# Patient Record
Sex: FEMALE | Race: WHITE | NOT HISPANIC OR LATINO | Employment: UNEMPLOYED | URBAN - METROPOLITAN AREA
[De-identification: names, ages, dates, MRNs, and addresses within clinical notes are randomized per-mention and may not be internally consistent; named-entity substitution may affect disease eponyms.]

---

## 2017-01-26 ENCOUNTER — TRANSFERRED RECORDS (OUTPATIENT)
Dept: HEALTH INFORMATION MANAGEMENT | Facility: CLINIC | Age: 37
End: 2017-01-26

## 2017-05-26 ENCOUNTER — TELEPHONE (OUTPATIENT)
Dept: FAMILY MEDICINE | Facility: CLINIC | Age: 37
End: 2017-05-26

## 2017-05-26 NOTE — TELEPHONE ENCOUNTER
Panel Management Review      Patient has the following on her problem list: None      Composite cancer screening  Chart review shows that this patient is due/due soon for the following Pap Smear  Summary:    Patient is due/failing the following:   PAP and PHYSICAL    Action needed:   Patient needs office visit for Pap and PX.    Type of outreach:    LMTC regarding Pap and if she goes to OBGYN?    Questions for provider review:    None                                                                                                                                    Shanique NYE M.A.       Chart routed to Care Team .

## 2017-07-19 ENCOUNTER — OFFICE VISIT (OUTPATIENT)
Dept: FAMILY MEDICINE | Facility: CLINIC | Age: 37
End: 2017-07-19
Payer: COMMERCIAL

## 2017-07-19 VITALS
SYSTOLIC BLOOD PRESSURE: 100 MMHG | HEIGHT: 66 IN | TEMPERATURE: 97.9 F | HEART RATE: 84 BPM | OXYGEN SATURATION: 100 % | DIASTOLIC BLOOD PRESSURE: 62 MMHG

## 2017-07-19 DIAGNOSIS — Z00.00 ENCOUNTER FOR ROUTINE ADULT HEALTH EXAMINATION WITHOUT ABNORMAL FINDINGS: Primary | ICD-10-CM

## 2017-07-19 PROCEDURE — G0145 SCR C/V CYTO,THINLAYER,RESCR: HCPCS | Performed by: NURSE PRACTITIONER

## 2017-07-19 PROCEDURE — 99395 PREV VISIT EST AGE 18-39: CPT | Performed by: NURSE PRACTITIONER

## 2017-07-19 PROCEDURE — 87624 HPV HI-RISK TYP POOLED RSLT: CPT | Performed by: NURSE PRACTITIONER

## 2017-07-19 RX ORDER — MULTIVITAMIN WITH IRON
1 TABLET ORAL DAILY
Qty: 30 TABLET | COMMUNITY
Start: 2017-07-19

## 2017-07-19 NOTE — MR AVS SNAPSHOT
After Visit Summary   7/19/2017    Rafa Herring    MRN: [id number]           Patient Information     Date Of Birth          1980        Visit Information        Provider Department      7/19/2017 10:00 AM Alethea Edward Ra, APRN [facility]        Care Instructions      Preventive Health Recommendations  Female Ages 26 - 39  Yearly exam:   See your health care provider every year in order to    Review health changes.     Discuss preventive care.      Review your medicines if you your doctor has prescribed any.    Until age 30: Get a Pap test every three years (more often if you have had an abnormal result).    After age 30: Talk to your doctor about whether you should have a Pap test every 3 years or have a Pap test with HPV screening every 5 years.   You do not need a Pap test if your uterus was removed (hysterectomy) and you have not had cancer.  You should be tested each year for STDs (sexually transmitted diseases), if you're at risk.   Talk to your provider about how often to have your cholesterol checked.  If you are at risk for diabetes, you should have a diabetes test (fasting glucose).  Shots: Get a flu shot each year. Get a tetanus shot every 10 years.   Nutrition:     Eat at least 5 servings of fruits and vegetables each day.    Eat whole-grain bread, whole-wheat pasta and brown rice instead of white grains and rice.    Talk to your provider about Calcium and Vitamin D.     Lifestyle    Exercise at least 150 minutes a week (30 minutes a day, 5 days of the week). This will help you control your weight and prevent disease.    Limit alcohol to one drink per day.    No smoking.     Wear sunscreen to prevent skin cancer.    See your dentist every six months for an exam and cleaning.            Follow-ups after your visit        Who to contact     If you have questions or need follow up information about today's clinic visit or your schedule please contact [place]  [facility] directly at [phone].  Normal or non-critical lab and imaging results will be communicated to you by MyChart, letter or phone within 4 business days after the clinic has received the results. If you do not hear from us within 7 days, please contact the clinic through Doppelgangerhart or phone. If you have a critical or abnormal lab result, we will notify you by phone as soon as possible.  Submit refill requests through Trubates or call your pharmacy and they will forward the refill request to us. Please allow 3 business days for your refill to be completed.          Additional Information About Your Visit        DoppelgangerharTheFormTool Information     Trubates gives you secure access to your electronic health record. If you see a primary care provider, you can also send messages to your care team and make appointments. If you have questions, please call your primary care clinic.  If you do not have a primary care provider, please call [phone] and they will assist you.        Care EveryWhere ID     This is your Care EveryWhere ID. This could be used by other organizations to access your [facility] medical records  [id number]        Your Vitals Were     Pulse Temperature Height Last Period Pulse Oximetry       84 97.9  F (36.6  C) (Oral) 5' 5.5\" (1.664 m) 06/29/2017 100%        Blood Pressure from Last 3 Encounters:   07/19/17 100/62   11/03/16 108/70    Weight from Last 3 Encounters:   11/03/16 145 lb (65.8 kg)              Today, you had the following     No orders found for display       Primary Care Provider    None Specified       No primary provider on file.        Equal Access to Services     [facility]: Hadii francis wooteno Sohayde, waaxda luqadaha, qaybta kaalmada loreneyada, lamberto dinh . So [facility] [phone].    ATENCIÓN: Si habla español, tiene a garg disposición servicios gratuitos de asistencia lingüística. Llame al [phone].    We comply with applicable federal civil " rights laws and [place] laws. We do not discriminate on the basis of race, color, national origin, age, disability sex, sexual orientation or gender identity.            Thank you!     Thank you for choosing [facility]  for your care. Our goal is always to provide you with excellent care. Hearing back from our patients is one way we can continue to improve our services. Please take a few minutes to complete the written survey that you may receive in the mail after your visit with us. Thank you!             Your Updated Medication List - Protect others around you: Learn how to safely use, store and throw away your medicines at www.disposemymeds.org.          This list is accurate as of: 7/19/17 10:27 AM.  Always use your most recent med list.                   Brand Name Dispense Instructions for use Diagnosis    magnesium 250 MG tablet     30 tablet    Take 1 tablet by mouth daily

## 2017-07-19 NOTE — NURSING NOTE
"Chief Complaint   Patient presents with     Physical       Initial /62  Pulse 84  Temp 97.9  F (36.6  C) (Oral)  Ht 5' 5.5\" (1.664 m)  LMP 06/29/2017  SpO2 100% Estimated body mass index is 23.76 kg/(m^2) as calculated from the following:    Height as of 11/3/16: 5' 5.5\" (1.664 m).    Weight as of 11/3/16: 145 lb (65.8 kg).  Medication Reconciliation: complete   ENRIQUE DukeA.      "

## 2017-07-19 NOTE — PROGRESS NOTES
SUBJECTIVE:   CC: Rafa Herring is an 36 year old woman who presents for preventive health visit.     Physical   Annual:     Getting at least 3 servings of Calcium per day::  Yes    Bi-annual eye exam::  NO    Dental care twice a year::  Yes    Sleep apnea or symptoms of sleep apnea::  None    Diet::  Regular (no restrictions)    Frequency of exercise::  2-3 days/week    Duration of exercise::  15-30 minutes    Taking medications regularly::  Not Applicable    Additional concerns today::  No          Today's PHQ-2 Score: PHQ-2 ( 1999 Pfizer) 7/18/2017   Q1: Little interest or pleasure in doing things 0   Q2: Feeling down, depressed or hopeless 0   PHQ-2 Score 0   Q1: Little interest or pleasure in doing things Not at all   Q2: Feeling down, depressed or hopeless Not at all   PHQ-2 Score 0       Abuse: Current or Past(Physical, Sexual or Emotional)- No  Do you feel safe in your environment - Yes    Social History   Substance Use Topics     Smoking status: Never Smoker     Smokeless tobacco: Never Used     Alcohol use No     The patient does not drink >3 drinks per day nor >7 drinks per week.    Reviewed orders with patient.  Reviewed health maintenance and updated orders accordingly - Yes  There is no problem list on file for this patient.    Past Surgical History:   Procedure Laterality Date     PHOTOREFRACTIVE KERATECTOMY  2004       Social History   Substance Use Topics     Smoking status: Never Smoker     Smokeless tobacco: Never Used     Alcohol use No     Family History   Problem Relation Age of Onset     Glaucoma Maternal Grandmother      Family History Negative Mother      Family History Negative Father      Macular Degeneration No family hx of              Mammogram not appropriate for this patient based on age.    Pertinent mammograms are reviewed under the imaging tab.  History of abnormal Pap smear: NO - age 30-65 PAP every 5 years with negative HPV co-testing recommended    Reviewed and updated as  "needed this visit by clinical staffTobacco  Allergies  Meds  Med Hx  Surg Hx  Fam Hx  Soc Hx        Reviewed and updated as needed this visit by Provider          ROS:  C: NEGATIVE for fever, chills, change in weight  I: NEGATIVE for worrisome rashes, moles or lesions  E: NEGATIVE for vision changes or irritation  ENT: NEGATIVE for ear, mouth and throat problems  R: NEGATIVE for significant cough or SOB  B: NEGATIVE for masses, tenderness or discharge  CV: NEGATIVE for chest pain, palpitations or peripheral edema  GI: NEGATIVE for nausea, abdominal pain, heartburn, or change in bowel habits  : NEGATIVE for unusual urinary or vaginal symptoms. Periods are regular.  M: NEGATIVE for significant arthralgias or myalgia  N: NEGATIVE for weakness, dizziness or paresthesias  P: NEGATIVE for changes in mood or affect     OBJECTIVE:    5' 5.5\" (1.664 m)  EXAM:  GENERAL: healthy, alert and no distress  EYES: Eyes grossly normal to inspection, PERRL and conjunctivae and sclerae normal  HENT: ear canals and TM's normal, nose and mouth without ulcers or lesions  NECK: no adenopathy, no asymmetry, masses, or scars and thyroid normal to palpation  RESP: lungs clear to auscultation - no rales, rhonchi or wheezes  BREAST: normal without masses, tenderness or nipple discharge and no palpable axillary masses or adenopathy  CV: regular rate and rhythm, normal S1 S2, no S3 or S4, no murmur, click or rub, no peripheral edema and peripheral pulses strong  ABDOMEN: soft, nontender, no hepatosplenomegaly, no masses and bowel sounds normal   (female): normal female external genitalia, normal urethral meatus , vaginal mucosa pink, moist, well rugated and normal cervix  MS: no gross musculoskeletal defects noted, no edema  SKIN: no suspicious lesions or rashes  NEURO: Normal strength and tone, mentation intact and speech normal  PSYCH: mentation appears normal, affect normal/bright    ASSESSMENT/PLAN:   1. Encounter for routine " "adult health examination without abnormal findings  36 y.o. Female, here today for wwe.  No concerns.  Future labs when fasting.  Pap today.  - **Comprehensive metabolic panel FUTURE 1yr; Future  - Lipid panel reflex to direct LDL; Future  - Pap imaged thin layer screen with HPV - recommended age 30 - 65 years (select HPV order below)    COUNSELING:  Reviewed preventive health counseling, as reflected in patient instructions       reports that she has never smoked. She has never used smokeless tobacco.    Estimated body mass index is 23.76 kg/(m^2) as calculated from the following:    Height as of 11/3/16: 5' 5.5\" (1.664 m).    Weight as of 11/3/16: 145 lb (65.8 kg).         Counseling Resources:  ATP IV Guidelines  Pooled Cohorts Equation Calculator  Breast Cancer Risk Calculator  FRAX Risk Assessment  ICSI Preventive Guidelines  Dietary Guidelines for Americans, 2010  USDA's MyPlate  ASA Prophylaxis  Lung CA Screening    FINN Campos Ra [facility]  "

## 2017-07-21 LAB
COPATH REPORT: NORMAL
PAP: NORMAL

## 2017-07-25 LAB
FINAL DIAGNOSIS: NORMAL
HPV HR 12 DNA CVX QL NAA+PROBE: NEGATIVE
HPV16 DNA SPEC QL NAA+PROBE: NEGATIVE
HPV18 DNA SPEC QL NAA+PROBE: NEGATIVE
SPECIMEN DESCRIPTION: NORMAL

## 2017-09-05 ENCOUNTER — TRANSFERRED RECORDS (OUTPATIENT)
Dept: HEALTH INFORMATION MANAGEMENT | Facility: CLINIC | Age: 37
End: 2017-09-05

## 2017-09-08 ENCOUNTER — ALLIED HEALTH/NURSE VISIT (OUTPATIENT)
Dept: NURSING | Facility: CLINIC | Age: 37
End: 2017-09-08
Payer: COMMERCIAL

## 2017-09-08 DIAGNOSIS — Z23 NEED FOR PROPHYLACTIC VACCINATION AND INOCULATION AGAINST INFLUENZA: Primary | ICD-10-CM

## 2017-09-08 PROCEDURE — 90471 IMMUNIZATION ADMIN: CPT

## 2017-09-08 PROCEDURE — 90686 IIV4 VACC NO PRSV 0.5 ML IM: CPT

## 2017-09-08 PROCEDURE — 99207 ZZC NO CHARGE NURSE ONLY: CPT

## 2017-09-08 NOTE — MR AVS SNAPSHOT
After Visit Summary   9/8/2017    Rafa Herring    MRN: [id number]           Patient Information     Date Of Birth          1980        Visit Information        Provider Department      9/8/2017 11:30 AM  NURSE [facility]        Today's Diagnoses     Need for prophylactic vaccination and inoculation against influenza    -  1       Follow-ups after your visit        Who to contact     If you have questions or need follow up information about today's clinic visit or your schedule please contact [facility] directly at [phone].  Normal or non-critical lab and imaging results will be communicated to you by Gecko TVhart, letter or phone within 4 business days after the clinic has received the results. If you do not hear from us within 7 days, please contact the clinic through sceniost or phone. If you have a critical or abnormal lab result, we will notify you by phone as soon as possible.  Submit refill requests through Cinnamon or call your pharmacy and they will forward the refill request to us. Please allow 3 business days for your refill to be completed.          Additional Information About Your Visit        MyChart Information     Cinnamon gives you secure access to your electronic health record. If you see a primary care provider, you can also send messages to your care team and make appointments. If you have questions, please call your primary care clinic.  If you do not have a primary care provider, please call [phone] and they will assist you.        Care EveryWhere ID     This is your Care EveryWhere ID. This could be used by other organizations to access your [facility] medical records  [id number]         Blood Pressure from Last 3 Encounters:   07/19/17 100/62   11/03/16 108/70    Weight from Last 3 Encounters:   11/03/16 145 lb (65.8 kg)              We Performed the Following     FLU VAC, SPLIT VIRUS IM > 3 YO (QUADRIVALENT) [04138]     Vaccine  Administration, Initial [16454]        Primary Care Provider    None Specified       No primary provider on file.        Equal Access to Services     [facility] : Hadii francis Calle, feliciano collins, robinsky griffithspadminilinette waldrop, lamberto brookeedenzandra lewis. So [facility] [phone].    ATENCIÓN: Si habla español, tiene a garg disposición servicios gratuitos de asistencia lingüística. Llame al [phone].    We comply with applicable federal civil rights laws and [place] laws. We do not discriminate on the basis of race, color, national origin, age, disability sex, sexual orientation or gender identity.            Thank you!     Thank you for choosing [facility]  for your care. Our goal is always to provide you with excellent care. Hearing back from our patients is one way we can continue to improve our services. Please take a few minutes to complete the written survey that you may receive in the mail after your visit with us. Thank you!             Your Updated Medication List - Protect others around you: Learn how to safely use, store and throw away your medicines at www.disposemymeds.org.          This list is accurate as of: 9/8/17 11:41 AM.  Always use your most recent med list.                   Brand Name Dispense Instructions for use Diagnosis    magnesium 250 MG tablet     30 tablet    Take 1 tablet by mouth daily

## 2017-09-08 NOTE — PROGRESS NOTES
Injectable Influenza Immunization Documentation    1.  Are you sick today? (Fever of 100.5 or higher on the day of the clinic)   No    2.  Have you ever had Guillain-Pioneer Syndrome within 6 weeks of an influenza vaccionation?  No    3. Do you have a life-threatening allergy to eggs?  No    4. Do you have a life-threatening allergy to a component of the vaccine? May include antibiotics, gelatin or latex.  No     5. Have you ever had a reaction to a dose of flu vaccine that needed immediate medical attention?  No     Form completed by Shanique NYE M.A.

## 2017-09-21 ENCOUNTER — TRANSFERRED RECORDS (OUTPATIENT)
Dept: HEALTH INFORMATION MANAGEMENT | Facility: CLINIC | Age: 37
End: 2017-09-21

## 2017-11-17 ENCOUNTER — OFFICE VISIT (OUTPATIENT)
Dept: FAMILY MEDICINE | Facility: CLINIC | Age: 37
End: 2017-11-17
Payer: OTHER GOVERNMENT

## 2017-11-17 VITALS
WEIGHT: 147.9 LBS | HEART RATE: 91 BPM | BODY MASS INDEX: 23.77 KG/M2 | RESPIRATION RATE: 16 BRPM | OXYGEN SATURATION: 100 % | TEMPERATURE: 98.2 F | SYSTOLIC BLOOD PRESSURE: 102 MMHG | HEIGHT: 66 IN | DIASTOLIC BLOOD PRESSURE: 70 MMHG

## 2017-11-17 DIAGNOSIS — G43.709 CHRONIC MIGRAINE WITHOUT AURA WITHOUT STATUS MIGRAINOSUS, NOT INTRACTABLE: Primary | ICD-10-CM

## 2017-11-17 LAB
ERYTHROCYTE [DISTWIDTH] IN BLOOD BY AUTOMATED COUNT: 12.1 % (ref 10–15)
ERYTHROCYTE [SEDIMENTATION RATE] IN BLOOD BY WESTERGREN METHOD: 9 MM/H (ref 0–20)
HCT VFR BLD AUTO: 39.2 % (ref 35–47)
HGB BLD-MCNC: 12.7 G/DL (ref 11.7–15.7)
MCH RBC QN AUTO: 29.7 PG (ref 26.5–33)
MCHC RBC AUTO-ENTMCNC: 32.4 G/DL (ref 31.5–36.5)
MCV RBC AUTO: 92 FL (ref 78–100)
PLATELET # BLD AUTO: 229 10E9/L (ref 150–450)
RBC # BLD AUTO: 4.27 10E12/L (ref 3.8–5.2)
WBC # BLD AUTO: 6.5 10E9/L (ref 4–11)

## 2017-11-17 PROCEDURE — 80053 COMPREHEN METABOLIC PANEL: CPT | Performed by: NURSE PRACTITIONER

## 2017-11-17 PROCEDURE — 84443 ASSAY THYROID STIM HORMONE: CPT | Performed by: NURSE PRACTITIONER

## 2017-11-17 PROCEDURE — 99214 OFFICE O/P EST MOD 30 MIN: CPT | Performed by: NURSE PRACTITIONER

## 2017-11-17 PROCEDURE — 85027 COMPLETE CBC AUTOMATED: CPT | Performed by: NURSE PRACTITIONER

## 2017-11-17 PROCEDURE — 36415 COLL VENOUS BLD VENIPUNCTURE: CPT | Performed by: NURSE PRACTITIONER

## 2017-11-17 PROCEDURE — 85652 RBC SED RATE AUTOMATED: CPT | Performed by: NURSE PRACTITIONER

## 2017-11-17 RX ORDER — SUMATRIPTAN 25 MG/1
25-50 TABLET, FILM COATED ORAL
Qty: 9 TABLET | Refills: 1 | Status: SHIPPED | OUTPATIENT
Start: 2017-11-17 | End: 2017-12-12

## 2017-11-17 NOTE — NURSING NOTE
"Chief Complaint   Patient presents with     Headache       Initial /70 (BP Location: Right arm, Patient Position: Chair, Cuff Size: Adult Large)  Pulse 91  Temp 98.2  F (36.8  C) (Oral)  Resp 16  Ht 5' 5.5\" (1.664 m)  Wt 147 lb 14.4 oz (67.1 kg)  SpO2 100%  BMI 24.24 kg/m2 Estimated body mass index is 24.24 kg/(m^2) as calculated from the following:    Height as of this encounter: 5' 5.5\" (1.664 m).    Weight as of this encounter: 147 lb 14.4 oz (67.1 kg).  Medication Reconciliation: complete   Agnes Hou MA       "

## 2017-11-17 NOTE — PATIENT INSTRUCTIONS
Monitor your symptoms closely.  Start to journal about your headaches to look for a trigger.  Use the imitrex as needed.  Update me with how you are feeling and I will let you know about your lab results.

## 2017-11-17 NOTE — PROGRESS NOTES
SUBJECTIVE:   Rafa Herring is a 37 year old female who presents to clinic today for the following health issues:    Headaches/Migrianes       Duration: Ongoing over the last few years     Description  Location: bilateral in the temporal area   Character: throbbing pain  Frequency:  10 days out of every month   Duration:  Depends, but usually several days.     Intensity:  moderate, severe    Accompanying signs and symptoms:    Precipitating or Alleviating factors:  Nausea/vomiting: usually nausea   Dizziness: no  Weakness or numbness: no  Visual changes: none  Fever: no   Sinus or URI symptoms no     History  Head trauma: YES- fell on head during childhood.   Family history of migraines: YES- mother   Previous tests for headaches: no   Neurologist evaluations: no   Able to do daily activities when headache present: YES- but it makes it very difficult.   Wake with headaches: YES  Daily pain medication use: not daily, but when migraine is present will try OTC pain relief.   Any changes in: None    Precipitating or Alleviating factors (light/sound/sleep/caffeine): None     Therapies tried and outcome: Ibuprofen (Advil, Motrin), Naproxyn (Aleve), Tylenol and Excedrin    Outcome - Excedrin migraine used to work, but no longer seems to be effective.   Frequent/daily pain medication use: no     Migraines started with her first pregnancy 8 years ago.  Intermittent for the following years, controlled with prn excedrin migraine.  Over the past year she has had about 7-10 days per month where she experiences symptoms.  Typically start on one side, lasts for 3 days, then resolves but moves to the other side for another 3 days.  Excedrin sometimes helpful, other times not.  She also uses aleve.  Thinks it could be associated with menses but unsure of any specific triggers.  No fevers, n/v.  Normal intake.  No weight loss.  Normal hearing and vision.  No aura with migraines but does have nausea.  Periods are regular in  "frequency.  Sleep is disrupted with young children.    Problem list and histories reviewed & adjusted, as indicated.  Additional history: as documented    There is no problem list on file for this patient.    Past Surgical History:   Procedure Laterality Date     PHOTOREFRACTIVE KERATECTOMY  2004       Social History   Substance Use Topics     Smoking status: Never Smoker     Smokeless tobacco: Never Used     Alcohol use No     Family History   Problem Relation Age of Onset     Glaucoma Maternal Grandmother      Family History Negative Mother      Family History Negative Father      Macular Degeneration No family hx of              Reviewed and updated as needed this visit by clinical staff       Reviewed and updated as needed this visit by Provider         ROS:  SEE HPI.    OBJECTIVE:     /70 (BP Location: Right arm, Patient Position: Chair, Cuff Size: Adult Large)  Pulse 91  Temp 98.2  F (36.8  C) (Oral)  Resp 16  Ht 5' 5.5\" (1.664 m)  Wt 147 lb 14.4 oz (67.1 kg)  SpO2 100%  BMI 24.24 kg/m2  Body mass index is 24.24 kg/(m^2).  GENERAL: healthy, alert and no distress  EYES: Eyes grossly normal to inspection, PERRL and conjunctivae and sclerae normal  HENT: ear canals and TM's normal, nose and mouth without ulcers or lesions  NECK: no adenopathy, no asymmetry, masses, or scars and thyroid normal to palpation  RESP: lungs clear to auscultation - no rales, rhonchi or wheezes  CV: regular rate and rhythm, normal S1 S2, no S3 or S4, no murmur, click or rub, no peripheral edema and peripheral pulses strong  ABDOMEN: soft, nontender, no hepatosplenomegaly, no masses and bowel sounds normal  NEURO: Normal strength and tone, sensory exam grossly normal, mentation intact, cranial nerves 2-12 intact and rapid alternating movements normal  PSYCH: mentation appears normal, affect normal/bright    Diagnostic Test Results:  Results for orders placed or performed in visit on 11/17/17 (from the past 24 hour(s))   CBC " with platelets   Result Value Ref Range    WBC 6.5 4.0 - 11.0 10e9/L    RBC Count 4.27 3.8 - 5.2 10e12/L    Hemoglobin 12.7 11.7 - 15.7 g/dL    Hematocrit 39.2 35.0 - 47.0 %    MCV 92 78 - 100 fl    MCH 29.7 26.5 - 33.0 pg    MCHC 32.4 31.5 - 36.5 g/dL    RDW 12.1 10.0 - 15.0 %    Platelet Count 229 150 - 450 10e9/L   ESR: Erythrocyte sedimentation rate   Result Value Ref Range    Sed Rate 9 0 - 20 mm/h       ASSESSMENT/PLAN:   1. Chronic migraine without aura without status migrainosus, not intractable  37 y.o. Female, long hx of migraines, more prominent over the past 1 year.  Discussed options.  Labs today.  Start a journal to look for trigger.  Trial prn imitrex.  If unable to identify trigger or make modifications, will consider propranolol and she will return for this.  Pt agrees with plan and verbalized understanding.  - Comprehensive metabolic panel (BMP + Alb, Alk Phos, ALT, AST, Total. Bili, TP)  - CBC with platelets  - ESR: Erythrocyte sedimentation rate  - TSH with free T4 reflex  - SUMAtriptan (IMITREX) 25 MG tablet; Take 1-2 tablets (25-50 mg) by mouth at onset of headache for migraine May repeat in 2 hours. Max 8 tablets/24 hours.  Dispense: 9 tablet; Refill: 1    FINN Campos Ra, CNP  [facility]

## 2017-11-17 NOTE — MR AVS SNAPSHOT
After Visit Summary   11/17/2017    Rafa Herring    MRN: [id number]           Patient Information     Date Of Birth          1980        Visit Information        Provider Department      11/17/2017 8:40 AM Alethea Edward Ra, APRN CNP [facility]        Today's Diagnoses     Chronic migraine without aura without status migrainosus, not intractable    -  1      Care Instructions    Monitor your symptoms closely.  Start to journal about your headaches to look for a trigger.  Use the imitrex as needed.  Update me with how you are feeling and I will let you know about your lab results.          Follow-ups after your visit        Who to contact     If you have questions or need follow up information about today's clinic visit or your schedule please contact [facility] directly at [phone].  Normal or non-critical lab and imaging results will be communicated to you by MyChart, letter or phone within 4 business days after the clinic has received the results. If you do not hear from us within 7 days, please contact the clinic through MyChart or phone. If you have a critical or abnormal lab result, we will notify you by phone as soon as possible.  Submit refill requests through [employer] or call your pharmacy and they will forward the refill request to us. Please allow 3 business days for your refill to be completed.          Additional Information About Your Visit        MyChart Information     [employer] gives you secure access to your electronic health record. If you see a primary care provider, you can also send messages to your care team and make appointments. If you have questions, please call your primary care clinic.  If you do not have a primary care provider, please call [phone] and they will assist you.        Care EveryWhere ID     This is your Care EveryWhere ID. This could be used by other organizations to access your [facility]  "records  [id number]        Your Vitals Were     Pulse Temperature Respirations Height Pulse Oximetry BMI (Body Mass Index)    91 98.2  F (36.8  C) (Oral) 16 5' 5.5\" (1.664 m) 100% 24.24 kg/m2       Blood Pressure from Last 3 Encounters:   11/17/17 102/70   07/19/17 100/62   11/03/16 108/70    Weight from Last 3 Encounters:   11/17/17 147 lb 14.4 oz (67.1 kg)   11/03/16 145 lb (65.8 kg)              We Performed the Following     CBC with platelets     Comprehensive metabolic panel (BMP + Alb, Alk Phos, ALT, AST, Total. Bili, TP)     ESR: Erythrocyte sedimentation rate     TSH with free T4 reflex          Today's Medication Changes          These changes are accurate as of: 11/17/17  9:20 AM.  If you have any questions, ask your nurse or doctor.               Start taking these medicines.        Dose/Directions    SUMAtriptan 25 MG tablet   Commonly known as:  IMITREX   Used for:  Chronic migraine without aura without status migrainosus, not intractable   Started by:  Alethea Edward Ra, APRN CNP        Dose:  25-50 mg   Take 1-2 tablets (25-50 mg) by mouth at onset of headache for migraine May repeat in 2 hours. Max 8 tablets/24 hours.   Quantity:  9 tablet   Refills:  1            Where to get your medicines      These medications were sent to [facility] [place], [place] - [address]  [address] [place] [address] [facility] [address]     Phone:  [phone]     SUMAtriptan 25 MG tablet                Primary Care Provider Office Phone # Fax #    FINN Campos Ra [facility] [phone]       [address]  [facility] [address]        Equal Access to Services     WENDY DENG AH: Lawson Calle, waaxda luqadaha, qaybta kaalmada adeegyada, lamberto lewis. So [facility] [place] [phone].    ATENCIÓN: Si habla español, tiene a garg disposición servicios gratuitos de asistencia lingüística. Llame al [phone].    We comply with applicable federal civil " rights laws and [place] laws. We do not discriminate on the basis of race, color, national origin, age, disability, sex, sexual orientation, or gender identity.            Thank you!     Thank you for choosing [facility]  for your care. Our goal is always to provide you with excellent care. Hearing back from our patients is one way we can continue to improve our services. Please take a few minutes to complete the written survey that you may receive in the mail after your visit with us. Thank you!             Your Updated Medication List - Protect others around you: Learn how to safely use, store and throw away your medicines at www.disposemymeds.org.          This list is accurate as of: 11/17/17  9:20 AM.  Always use your most recent med list.                   Brand Name Dispense Instructions for use Diagnosis    magnesium 250 MG tablet     30 tablet    Take 1 tablet by mouth daily        SUMAtriptan 25 MG tablet    IMITREX    9 tablet    Take 1-2 tablets (25-50 mg) by mouth at onset of headache for migraine May repeat in 2 hours. Max 8 tablets/24 hours.    Chronic migraine without aura without status migrainosus, not intractable       UNABLE TO FIND      MEDICATION NAME: MindClear, multivitamin, magnesium/herbal vitamin.

## 2017-11-19 LAB
ALBUMIN SERPL-MCNC: 4 G/DL (ref 3.4–5)
ALP SERPL-CCNC: 71 U/L (ref 40–150)
ALT SERPL W P-5'-P-CCNC: 23 U/L (ref 0–50)
ANION GAP SERPL CALCULATED.3IONS-SCNC: 8 MMOL/L (ref 3–14)
AST SERPL W P-5'-P-CCNC: 12 U/L (ref 0–45)
BILIRUB SERPL-MCNC: 0.5 MG/DL (ref 0.2–1.3)
BUN SERPL-MCNC: 17 MG/DL (ref 7–30)
CALCIUM SERPL-MCNC: 8.8 MG/DL (ref 8.5–10.1)
CHLORIDE SERPL-SCNC: 108 MMOL/L (ref 94–109)
CO2 SERPL-SCNC: 23 MMOL/L (ref 20–32)
CREAT SERPL-MCNC: 0.88 MG/DL (ref 0.52–1.04)
GFR SERPL CREATININE-BSD FRML MDRD: 72 ML/MIN/1.7M2
GLUCOSE SERPL-MCNC: 85 MG/DL (ref 70–99)
POTASSIUM SERPL-SCNC: 4.2 MMOL/L (ref 3.4–5.3)
PROT SERPL-MCNC: 7.8 G/DL (ref 6.8–8.8)
SODIUM SERPL-SCNC: 139 MMOL/L (ref 133–144)
TSH SERPL DL<=0.005 MIU/L-ACNC: 0.67 MU/L (ref 0.4–4)

## 2017-12-11 ENCOUNTER — MYC REFILL (OUTPATIENT)
Dept: FAMILY MEDICINE | Facility: CLINIC | Age: 37
End: 2017-12-11

## 2017-12-11 DIAGNOSIS — G43.709 CHRONIC MIGRAINE WITHOUT AURA WITHOUT STATUS MIGRAINOSUS, NOT INTRACTABLE: ICD-10-CM

## 2017-12-11 RX ORDER — SUMATRIPTAN 25 MG/1
25-50 TABLET, FILM COATED ORAL
Qty: 9 TABLET | Refills: 1 | Status: CANCELLED | OUTPATIENT
Start: 2017-12-11

## 2017-12-12 ENCOUNTER — MYC REFILL (OUTPATIENT)
Dept: FAMILY MEDICINE | Facility: CLINIC | Age: 37
End: 2017-12-12

## 2017-12-12 DIAGNOSIS — G43.709 CHRONIC MIGRAINE WITHOUT AURA WITHOUT STATUS MIGRAINOSUS, NOT INTRACTABLE: ICD-10-CM

## 2017-12-12 NOTE — TELEPHONE ENCOUNTER
Message from [employer]:  Original authorizing provider: FINN Campos Ra, CNP    Rafa Herring would like a refill of the following medications:  SUMAtriptan (IMITREX) 25 MG tablet [FINN Campos Ra, CNP]    Preferred pharmacy: [facility] [address]    Comment:  The SUMAtriptan seems to be helping reduce and relieve my migraines most of the time. I am still keeping a daily journal to keep closer track of what days I am having a migraine. So far I have not seen an obvious links to food triggers.

## 2017-12-12 NOTE — TELEPHONE ENCOUNTER
Message from [employer]:  Original authorizing provider: FINN Campos Ra, CNP    Rafa Herring would like a refill of the following medications:  SUMAtriptan (IMITREX) 25 MG tablet [FINN Campos Ra, CNP]    Preferred pharmacy: [facility] [address]    Comment:  I already used the refill. There are no refills remaining.

## 2017-12-13 RX ORDER — SUMATRIPTAN 25 MG/1
25-50 TABLET, FILM COATED ORAL
Qty: 9 TABLET | Refills: 1 | Status: SHIPPED | OUTPATIENT
Start: 2017-12-13 | End: 2019-01-25

## 2017-12-13 NOTE — TELEPHONE ENCOUNTER
Need to make sure not taking more than two times per week.  Will have rebound effect if taking more.  Consider propranolol as we discussed in clinic.  Return to clinic if she is open to this.  WARD

## 2017-12-18 ENCOUNTER — OFFICE VISIT (OUTPATIENT)
Dept: FAMILY MEDICINE | Facility: CLINIC | Age: 37
End: 2017-12-18
Payer: OTHER GOVERNMENT

## 2017-12-18 VITALS
OXYGEN SATURATION: 100 % | HEART RATE: 78 BPM | BODY MASS INDEX: 23.8 KG/M2 | DIASTOLIC BLOOD PRESSURE: 70 MMHG | WEIGHT: 145.2 LBS | TEMPERATURE: 97.7 F | SYSTOLIC BLOOD PRESSURE: 102 MMHG

## 2017-12-18 DIAGNOSIS — G43.709 CHRONIC MIGRAINE WITHOUT AURA WITHOUT STATUS MIGRAINOSUS, NOT INTRACTABLE: Primary | ICD-10-CM

## 2017-12-18 LAB
ALBUMIN SERPL-MCNC: 4 G/DL (ref 3.4–5)
ALP SERPL-CCNC: 77 U/L (ref 40–150)
ALT SERPL W P-5'-P-CCNC: 23 U/L (ref 0–50)
ANION GAP SERPL CALCULATED.3IONS-SCNC: 2 MMOL/L (ref 3–14)
AST SERPL W P-5'-P-CCNC: 11 U/L (ref 0–45)
BILIRUB SERPL-MCNC: 0.5 MG/DL (ref 0.2–1.3)
BUN SERPL-MCNC: 17 MG/DL (ref 7–30)
CALCIUM SERPL-MCNC: 9.1 MG/DL (ref 8.5–10.1)
CHLORIDE SERPL-SCNC: 107 MMOL/L (ref 94–109)
CHOLEST SERPL-MCNC: 162 MG/DL
CO2 SERPL-SCNC: 28 MMOL/L (ref 20–32)
CREAT SERPL-MCNC: 0.82 MG/DL (ref 0.52–1.04)
GFR SERPL CREATININE-BSD FRML MDRD: 78 ML/MIN/1.7M2
GLUCOSE SERPL-MCNC: 98 MG/DL (ref 70–99)
HDLC SERPL-MCNC: 68 MG/DL
LDLC SERPL CALC-MCNC: 75 MG/DL
NONHDLC SERPL-MCNC: 94 MG/DL
POTASSIUM SERPL-SCNC: 4.5 MMOL/L (ref 3.4–5.3)
PROT SERPL-MCNC: 8.1 G/DL (ref 6.8–8.8)
SODIUM SERPL-SCNC: 137 MMOL/L (ref 133–144)
TRIGL SERPL-MCNC: 96 MG/DL

## 2017-12-18 PROCEDURE — 36415 COLL VENOUS BLD VENIPUNCTURE: CPT | Performed by: NURSE PRACTITIONER

## 2017-12-18 PROCEDURE — 80053 COMPREHEN METABOLIC PANEL: CPT | Performed by: NURSE PRACTITIONER

## 2017-12-18 PROCEDURE — 99213 OFFICE O/P EST LOW 20 MIN: CPT | Performed by: NURSE PRACTITIONER

## 2017-12-18 PROCEDURE — 80061 LIPID PANEL: CPT | Performed by: NURSE PRACTITIONER

## 2017-12-18 RX ORDER — PROPRANOLOL HYDROCHLORIDE 10 MG/1
10 TABLET ORAL 2 TIMES DAILY
Qty: 60 TABLET | Refills: 1 | Status: SHIPPED | OUTPATIENT
Start: 2017-12-18 | End: 2018-02-05

## 2017-12-18 NOTE — NURSING NOTE
"Chief Complaint   Patient presents with     Headache       Initial /70  Pulse 78  Temp 97.7  F (36.5  C) (Oral)  Wt 145 lb 3.2 oz (65.9 kg)  SpO2 100%  BMI 23.8 kg/m2 Estimated body mass index is 23.8 kg/(m^2) as calculated from the following:    Height as of 11/17/17: 5' 5.5\" (1.664 m).    Weight as of this encounter: 145 lb 3.2 oz (65.9 kg).  Medication Reconciliation: complete   Shanique NYE M.A.      "

## 2017-12-18 NOTE — PROGRESS NOTES
SUBJECTIVE:   Rafa Herring is a 37 year old female who presents to clinic today for the following health issues:      Headaches      Duration: 4 days ago    Description  Location: unilateral in the right frontal area but switches sides  Character: throbbing pain, dull pain  Frequency:  15 in the month  Duration:  Several days    Intensity:  moderate    Accompanying signs and symptoms:    Precipitating or Alleviating factors:  Nausea/vomiting: usually  Dizziness: no  Weakness or numbness: no  Visual changes: none  Fever: no   Sinus or URI symptoms no     History  Head trauma: no  Family history of migraines: YES- Mom- Menopause  Previous tests for headaches: no   Neurologist evaluations: no   Able to do daily activities when headache present: YES- with difficulty  Wake with headaches: YES  Daily pain medication use: YES  Any changes in: None    Precipitating or Alleviating factors (light/sound/sleep/caffeine): None    Therapies tried and outcome: Imitrex    Outcome - not effective  Frequent/daily pain medication use: YES      Pt presents for follow up.  She has been keeping a journal regarding her headaches.  Found that she has had migraines 15 out of the past 30 days.  No clear trigger found.  She is using imitrex, but finding she is using too often.  It does help if not alleviate her symptoms, but often the migraine will return later that day or the following day.  She is also concerned as her dad had a stroke years ago and was told there was a clotting disorder that his kids may also have.    Problem list and histories reviewed & adjusted, as indicated.  Additional history: as documented    There is no problem list on file for this patient.    Past Surgical History:   Procedure Laterality Date     PHOTOREFRACTIVE KERATECTOMY  2004       Social History   Substance Use Topics     Smoking status: Never Smoker     Smokeless tobacco: Never Used     Alcohol use No     Family History   Problem Relation Age of Onset      Glaucoma Maternal Grandmother      Family History Negative Mother      Family History Negative Father      Macular Degeneration No family hx of              Reviewed and updated as needed this visit by clinical staff       Reviewed and updated as needed this visit by Provider         ROS:  SEE HPI.    OBJECTIVE:     /70  Pulse 78  Temp 97.7  F (36.5  C) (Oral)  Wt 145 lb 3.2 oz (65.9 kg)  SpO2 100%  BMI 23.8 kg/m2  Body mass index is 23.8 kg/(m^2).  GENERAL: healthy, alert and no distress  PSYCH: mentation appears normal, affect normal/bright    Diagnostic Test Results:  none     ASSESSMENT/PLAN:   1. Chronic migraine without aura without status migrainosus, not intractable  37 y.o. Female, hx of migraines, no trigger found.  Discussed options.  Start propranolol now.  Will plan on increasing dose over time.  Will return for bp check.  Neurology referral placed.  Pt agrees with plan and verbalized understanding.  - propranolol (INDERAL) 10 MG tablet; Take 1 tablet (10 mg) by mouth 2 times daily  Dispense: 60 tablet; Refill: 1  - NEUROLOGY ADULT REFERRAL    FINN Campos Ra, CNP  [facility]

## 2017-12-18 NOTE — MR AVS SNAPSHOT
After Visit Summary   12/18/2017    Rafa Herring    MRN: [id number]           Patient Information     Date Of Birth          1980        Visit Information        Provider Department      12/18/2017 7:40 AM Alethea Edward Ra, APRN CNP [facility]        Today's Diagnoses     Chronic migraine without aura without status migrainosus, not intractable    -  1    Encounter for routine adult health examination without abnormal findings          Care Instructions    Start the propranolol.  We can increase the dose of this pretty quickly as long as you are able to stop in for a blood pressure check at a [place] location.    Also, schedule with [facility] in [place].          Follow-ups after your visit        Additional Services     NEUROLOGY ADULT REFERRAL       Your provider has referred you for the following:   Consult at [facility] [phone]   [email or web address]    Please be aware that coverage of these services is subject to the terms and limitations of your health insurance plan.  Call member services at your health plan with any benefit or coverage questions.      Please bring the following with you to your appointment:    (1) Any X-Rays, CTs or MRIs which have been performed.  Contact the facility where they were done to arrange for  prior to your scheduled appointment.    (2) List of current medications  (3) This referral request   (4) Any documents/labs given to you for this referral                  Who to contact     If you have questions or need follow up information about today's clinic visit or your schedule please contact [facility] directly at [phone].  Normal or non-critical lab and imaging results will be communicated to you by MyChart, letter or phone within 4 business days after the clinic has received the results. If you do not hear from us within 7 days, please contact the clinic  through [employer] or phone. If you have a critical or abnormal lab result, we will notify you by phone as soon as possible.  Submit refill requests through [employer] or call your pharmacy and they will forward the refill request to us. Please allow 3 business days for your refill to be completed.          Additional Information About Your Visit        [employer] Information     [employer] gives you secure access to your electronic health record. If you see a primary care provider, you can also send messages to your care team and make appointments. If you have questions, please call your primary care clinic.  If you do not have a primary care provider, please call [phone] and they will assist you.        Care EveryWhere ID     This is your Care EveryWhere ID. This could be used by other organizations to access your [place] medical records  [id number]        Your Vitals Were     Pulse Temperature Pulse Oximetry BMI (Body Mass Index)          78 97.7  F (36.5  C) (Oral) 100% 23.8 kg/m2         Blood Pressure from Last 3 Encounters:   12/18/17 102/70   11/17/17 102/70   07/19/17 100/62    Weight from Last 3 Encounters:   12/18/17 145 lb 3.2 oz (65.9 kg)   11/17/17 147 lb 14.4 oz (67.1 kg)   11/03/16 145 lb (65.8 kg)              We Performed the Following     **Comprehensive metabolic panel FUTURE 1yr     Lipid panel reflex to direct LDL     NEUROLOGY ADULT REFERRAL          Today's Medication Changes          These changes are accurate as of: 12/18/17  8:17 AM.  If you have any questions, ask your nurse or doctor.               Start taking these medicines.        Dose/Directions    propranolol 10 MG tablet   Commonly known as:  INDERAL   Used for:  Chronic migraine without aura without status migrainosus, not intractable   Started by:  Alethea Edward Ra, APRN CNP        Dose:  10 mg   Take 1 tablet (10 mg) by mouth 2 times daily   Quantity:  60 tablet   Refills:  1            Where to get your medicines      These  medications were sent to [facility], [place] - [address]  [address], [facility] [address]     Phone:  [phone]     propranolol 10 MG tablet                Primary Care Provider Office Phone # Fax #    FINN Campos Ra, [phone] [address]  Counts include [address] at the [facility] [address]        Equal Access to Services     [facility] : Hadii aad ku hadasho Soomaali, waaxda luqadaha, qaybta kaalmada adeegyada, waxay idiin hayaan adeeg kharash la'aan . So [facility] [phone].    ATENCIÓN: Si habla espjoi, tiene a garg disposición servicios gratuitos de asistencia lingüística. Huame al [phone].    We comply with applicable federal civil rights laws and [place] laws. We do not discriminate on the basis of race, color, national origin, age, disability, sex, sexual orientation, or gender identity.            Thank you!     Thank you for choosing [facility]  for your care. Our goal is always to provide you with excellent care. Hearing back from our patients is one way we can continue to improve our services. Please take a few minutes to complete the written survey that you may receive in the mail after your visit with us. Thank you!             Your Updated Medication List - Protect others around you: Learn how to safely use, store and throw away your medicines at www.disposemymeds.org.          This list is accurate as of: 12/18/17  8:17 AM.  Always use your most recent med list.                   Brand Name Dispense Instructions for use Diagnosis    magnesium 250 MG tablet     30 tablet    Take 1 tablet by mouth daily        propranolol 10 MG tablet    INDERAL    60 tablet    Take 1 tablet (10 mg) by mouth 2 times daily    Chronic migraine without aura without status migrainosus, not intractable       SUMAtriptan 25 MG tablet    IMITREX    9 tablet    Take 1-2 tablets (25-50 mg) by mouth at onset of headache for migraine May repeat in 2 hours.  Max 8 tablets/24 hours.    Chronic migraine without aura without status migrainosus, not intractable

## 2017-12-18 NOTE — PATIENT INSTRUCTIONS
Start the propranolol.  We can increase the dose of this pretty quickly as long as you are able to stop in for a blood pressure check at a [place] location.    Also, schedule with [facility] in [place].

## 2017-12-26 ENCOUNTER — ALLIED HEALTH/NURSE VISIT (OUTPATIENT)
Dept: FAMILY MEDICINE | Facility: CLINIC | Age: 37
End: 2017-12-26
Payer: OTHER GOVERNMENT

## 2017-12-26 VITALS — DIASTOLIC BLOOD PRESSURE: 68 MMHG | HEART RATE: 60 BPM | SYSTOLIC BLOOD PRESSURE: 102 MMHG

## 2017-12-26 DIAGNOSIS — Z01.30 BLOOD PRESSURE CHECK: Primary | ICD-10-CM

## 2017-12-26 PROCEDURE — 99207 ZZC NO CHARGE NURSE ONLY: CPT | Performed by: NURSE PRACTITIONER

## 2017-12-26 NOTE — MR AVS SNAPSHOT
After Visit Summary   12/26/2017    Rafa Herring    MRN: [id number]           Patient Information     Date Of Birth          1980        Visit Information        Provider Department      12/26/2017 1:14 PM Alethea Edward Ra, APRN CNP [facility]        Today's Diagnoses     Blood pressure check    -  1       Follow-ups after your visit        Who to contact     If you have questions or need follow up information about today's clinic visit or your schedule please contact [facility] directly at [phone].  Normal or non-critical lab and imaging results will be communicated to you by Andelahart, letter or phone within 4 business days after the clinic has received the results. If you do not hear from us within 7 days, please contact the clinic through [employer] or phone. If you have a critical or abnormal lab result, we will notify you by phone as soon as possible.  Submit refill requests through [employer] or call your pharmacy and they will forward the refill request to us. Please allow 3 business days for your refill to be completed.          Additional Information About Your Visit        MyChart Information     [employer] gives you secure access to your electronic health record. If you see a primary care provider, you can also send messages to your care team and make appointments. If you have questions, please call your primary care clinic.  If you do not have a primary care provider, please call [phone] and they will assist you.        Care EveryWhere ID     This is your Care EveryWhere ID. This could be used by other organizations to access your [place] medical records  [id number]        Your Vitals Were     Pulse                   60            Blood Pressure from Last 3 Encounters:   12/26/17 102/68   12/18/17 102/70   11/17/17 102/70    Weight from Last 3 Encounters:   12/18/17 145 lb 3.2 oz (65.9 kg)   11/17/17 147 lb 14.4 oz (67.1 kg)   11/03/16 145 lb  (65.8 kg)              Today, you had the following     No orders found for display       Primary Care Provider Office Phone # Fax #    FINN Campos Ra [facility] [phone] [address]  [facility] [address]        Equal Access to Services     WENDY DENG : Hadii aad ku hadasho Soomaali, waaxda luqadaha, qaybta kaalmada adeegyada, waxay idiin hayaan adeeg kharash la'aba . So [facility] [phone].    ATENCIÓN: Si habla español, tiene a garg disposición servicios gratuitos de asistencia lingüística. Llame al [phone].    We comply with applicable federal civil rights laws and [place] laws. We do not discriminate on the basis of race, color, national origin, age, disability, sex, sexual orientation, or gender identity.            Thank you!     Thank you for choosing [facility]  for your care. Our goal is always to provide you with excellent care. Hearing back from our patients is one way we can continue to improve our services. Please take a few minutes to complete the written survey that you may receive in the mail after your visit with us. Thank you!             Your Updated Medication List - Protect others around you: Learn how to safely use, store and throw away your medicines at www.disposemymeds.org.          This list is accurate as of: 12/26/17  1:15 PM.  Always use your most recent med list.                   Brand Name Dispense Instructions for use Diagnosis    magnesium 250 MG tablet     30 tablet    Take 1 tablet by mouth daily        propranolol 10 MG tablet    INDERAL    60 tablet    Take 1 tablet (10 mg) by mouth 2 times daily    Chronic migraine without aura without status migrainosus, not intractable       SUMAtriptan 25 MG tablet    IMITREX    9 tablet    Take 1-2 tablets (25-50 mg) by mouth at onset of headache for migraine May repeat in 2 hours. Max 8 tablets/24 hours.    Chronic migraine without aura without status migrainosus, not intractable

## 2017-12-26 NOTE — PROGRESS NOTES
Rafa Herring is enrolled/participating in the retail pharmacy Blood Pressure Goals Achievement Program (BPGAP).  Rafa Herring was evaluated at [facility] on December 26, 2017 at which time her blood pressure was:    BP Readings from Last 3 Encounters:   12/26/17 102/68   12/18/17 102/70   11/17/17 102/70     Reviewed lifestyle modifications for blood pressure control and reduction: including making healthy food choices, managing weight, getting regular exercise, smoking cessation, reducing alcohol consumption, monitoring blood pressure regularly.     Rafa Herring is not experiencing symptoms.    Follow-Up: BP is at goal of < 140/90mmHg (patient 18+ years of age with or without diabetes).  Recommended follow-up at pharmacy in 6 months.     Recommendation to Provider: Continue current medication     Rafa Herring was evaluated for enrollment into the PGEN study today.    Patient eligible for enrollment:  No  Patient interested in enrollment:  No    Completed by: Ashleigh Parikh, PharmD  [facility]

## 2017-12-31 ENCOUNTER — HOSPITAL ENCOUNTER (EMERGENCY)
Facility: CLINIC | Age: 37
Discharge: HOME | End: 2017-12-31
Attending: NURSE PRACTITIONER | Admitting: NURSE PRACTITIONER
Payer: OTHER GOVERNMENT

## 2017-12-31 VITALS
TEMPERATURE: 98.2 F | SYSTOLIC BLOOD PRESSURE: 138 MMHG | BODY MASS INDEX: 22.5 KG/M2 | WEIGHT: 140 LBS | HEIGHT: 66 IN | OXYGEN SATURATION: 100 % | RESPIRATION RATE: 18 BRPM | DIASTOLIC BLOOD PRESSURE: 90 MMHG

## 2017-12-31 DIAGNOSIS — T15.92XA EYE FOREIGN BODY, LEFT, INITIAL ENCOUNTER: ICD-10-CM

## 2017-12-31 DIAGNOSIS — S05.02XA ABRASION OF LEFT CORNEA, INITIAL ENCOUNTER: ICD-10-CM

## 2017-12-31 PROCEDURE — 99283 EMERGENCY DEPT VISIT LOW MDM: CPT

## 2017-12-31 RX ORDER — HYDROCODONE BITARTRATE AND ACETAMINOPHEN 5; 325 MG/1; MG/1
1-2 TABLET ORAL EVERY 4 HOURS PRN
Qty: 15 TABLET | Refills: 0 | Status: SHIPPED | OUTPATIENT
Start: 2017-12-31 | End: 2018-06-24

## 2017-12-31 RX ORDER — TETRACAINE HYDROCHLORIDE 5 MG/ML
SOLUTION OPHTHALMIC
Status: DISCONTINUED
Start: 2017-12-31 | End: 2017-12-31 | Stop reason: HOSPADM

## 2017-12-31 RX ORDER — POLYMYXIN B SULFATE AND TRIMETHOPRIM 1; 10000 MG/ML; [USP'U]/ML
1 SOLUTION OPHTHALMIC
Qty: 1 BOTTLE | Refills: 0 | Status: SHIPPED | OUTPATIENT
Start: 2017-12-31 | End: 2018-01-07

## 2017-12-31 ASSESSMENT — VISUAL ACUITY
OS: 20/20
OD: 20/20

## 2017-12-31 ASSESSMENT — ENCOUNTER SYMPTOMS: EYE PAIN: 1

## 2017-12-31 NOTE — ED AVS SNAPSHOT
[facility] Department    [address]    [facility] [address]    Phone:  [phone]    Fax:  [phone]                                       Rafa Herring   MRN: [id number]    Department:  [facility] Emergency Department   Date of Visit:  12/31/2017           Patient Information     Date Of Birth          1980        Your diagnoses for this visit were:     Eye foreign body, left, initial encounter     Abrasion of left cornea, initial encounter        You were seen by Elmer Muller APRN CNP.      Follow-up Information     Please follow up.    Why:  follow up with opthamology in 24 hours        Discharge Instructions         Corneal Abrasion    You have received a scratch or scrape (abrasion) to your cornea. The cornea is the clear part in the front of the eye. This sensitive area is very painful when injured. You may make tears frequently, and your vision may be blurry until the injury heals. You may be sensitive to light.  This part of the body heals quickly. You can expect the pain to go away within 24 to 48 hours. If the abrasion is large or deep, your doctor may apply an eye patch, although this is not always done. An antibiotic ointment or eye drops may also be used to prevent infection.  Numbing drops may be used to relieve the pain temporarily so that your eyes can be examined. But these drops can t be prescribed for home use because that would prevent healing and lead to more serious problems. Also, if you can t feel your eye, there is a chance of accidentally injuring it further without knowing it.  Home care    A cold pack may be applied over the eye (or eye patch) for 20 minutes at a time, to reduce pain. To make a cold pack, put ice cubes in a plastic bag that seals at the top. Wrap the bag in a clean, thin towel or cloth.    You may use acetaminophen or ibuprofen to control pain, unless another pain medicine was prescribed. Note: If you have  chronic liver or kidney disease or have ever had a stomach ulcer or GI bleeding, talk with your doctor before using these medicines.    Rest your eyes and don t read until symptoms are gone.    If you use contact lenses, don t wear them until all symptoms are gone.    If your vision is affected by the corneal abrasion or if an eye patch was applied, don t drive a motor vehicle or operate machinery until all symptoms are gone. You may have trouble judging distances using only one eye.    If your eyes are sensitive to light, try wearing sunglasses, or stay indoors until symptoms go away.  Follow-up care  Follow up with your healthcare provider, or as advised.    If no patch was put on your eye and the pain continues for more than 48 hours, you should have another exam. Contact your healthcare provider to arrange this.    If your eye was patched and you were asked to remove the patch yourself, see your healthcare provider. Contact your healthcare provider if you still have pain after the patch is removed.    If you were given a return appointment for patch removal and re-examination, be sure to keep the appointment. Leaving the patch in place longer than advised could be harmful.  When to seek medical advice  Call your healthcare provider right away if any of these occur.    Increasing eye pain or pain that does not improve after 24 hours    Discharge from the eye    Increasing redness of the eye or swelling of the eyelids    Worsening vision    Symptoms get worse after the abrasion has healed  Date Last Reviewed: 7/1/2017 2000-2017 [employer]. [address] [place] [address]. All rights reserved. This information is not intended as a substitute for professional medical care. Always follow your healthcare professional's instructions.          24 Hour Appointment Hotline       To make an appointment at any [facility], call [phone] ([phone]). If you don't have a family  doctor or clinic, we will help you find one. [facility] are conveniently located to serve the needs of you and your family.             Review of your medicines      START taking        Dose / Directions Last dose taken    HYDROcodone-acetaminophen 5-325 MG per tablet   Commonly known as:  NORCO   Dose:  1-2 tablet   Quantity:  15 tablet        Take 1-2 tablets by mouth every 4 hours as needed for moderate to severe pain   Refills:  0        trimethoprim-polymyxin b ophthalmic solution   Commonly known as:  POLYTRIM   Dose:  1 drop   Quantity:  1 Bottle        Place 1 drop Into the left eye every 3 hours for 7 days   Refills:  0          Our records show that you are taking the medicines listed below. If these are incorrect, please call your family doctor or clinic.        Dose / Directions Last dose taken    magnesium 250 MG tablet   Dose:  1 tablet   Quantity:  30 tablet        Take 1 tablet by mouth daily   Refills:  0        propranolol 10 MG tablet   Commonly known as:  INDERAL   Dose:  10 mg   Quantity:  60 tablet        Take 1 tablet (10 mg) by mouth 2 times daily   Refills:  1        SUMAtriptan 25 MG tablet   Commonly known as:  IMITREX   Dose:  25-50 mg   Quantity:  9 tablet        Take 1-2 tablets (25-50 mg) by mouth at onset of headache for migraine May repeat in 2 hours. Max 8 tablets/24 hours.   Refills:  1                Prescriptions were sent or printed at these locations (2 Prescriptions)                   Other Prescriptions                Printed at Department/Unit printer (2 of 2)         HYDROcodone-acetaminophen (NORCO) 5-325 MG per tablet               trimethoprim-polymyxin b (POLYTRIM) ophthalmic solution                Orders Needing Specimen Collection     None      Pending Results     No orders found from 12/29/2017 to 1/1/2018.            Pending Culture Results     No orders found from 12/29/2017 to 1/1/2018.            Pending Results Instructions     If you had any lab results  that were not finalized at the time of your Discharge, you can call the ED Lab Result RN at [phone]. You will be contacted by this team for any positive Lab results or changes in treatment. The nurses are available 7 days a week from 10A to 6:30P.  You can leave a message 24 hours per day and they will return your call.        Test Results From Your Hospital Stay               Clinical Quality Measure: Blood Pressure Screening     Your blood pressure was checked while you were in the emergency department today. The last reading we obtained was  BP: 138/90 . Please read the guidelines below about what these numbers mean and what you should do about them.  If your systolic blood pressure (the top number) is less than 120 and your diastolic blood pressure (the bottom number) is less than 80, then your blood pressure is normal. There is nothing more that you need to do about it.  If your systolic blood pressure (the top number) is 120-139 or your diastolic blood pressure (the bottom number) is 80-89, your blood pressure may be higher than it should be. You should have your blood pressure rechecked within a year by a primary care provider.  If your systolic blood pressure (the top number) is 140 or greater or your diastolic blood pressure (the bottom number) is 90 or greater, you may have high blood pressure. High blood pressure is treatable, but if left untreated over time it can put you at risk for heart attack, stroke, or kidney failure. You should have your blood pressure rechecked by a primary care provider within the next 4 weeks.  If your provider in the emergency department today gave you specific instructions to follow-up with your doctor or provider even sooner than that, you should follow that instruction and not wait for up to 4 weeks for your follow-up visit.        Thank you for choosing [facility]       Thank you for choosing [facility] for your care. Our goal is always to provide you with excellent care.  Hearing back from our patients is one way we can continue to improve our services. Please take a few minutes to complete the written survey that you may receive in the mail after you visit with us. Thank you!        [employer] Information     [employer] gives you secure access to your electronic health record. If you see a primary care provider, you can also send messages to your care team and make appointments. If you have questions, please call your primary care clinic.  If you do not have a primary care provider, please call [phone] and they will assist you.        Care EveryWhere ID     This is your Care EveryWhere ID. This could be used by other organizations to access your [place] medical records  [id number]        Equal Access to Services     PETE DENG : Lawson Calle, feliciano collins, irena waldrop, lamberto lewis. So [facility] [phone].    ATENCIÓN: Si habla español, tiene a garg disposición servicios gratuitos de asistencia lingüística. Llame al [phone].    We comply with applicable federal civil rights laws and [place] laws. We do not discriminate on the basis of race, color, national origin, age, disability, sex, sexual orientation, or gender identity.            After Visit Summary       This is your record. Keep this with you and show to your community pharmacist(s) and doctor(s) at your next visit.

## 2017-12-31 NOTE — ED AVS SNAPSHOT
[facility] Emergency Department    [address]    [facility] [address]    Phone:  [phone]    Fax:  [phone]                                       Rafa Herring   MRN: [id number]    Department:  [facility] Emergency Department   Date of Visit:  12/31/2017           After Visit Summary Signature Page     I have received my discharge instructions, and my questions have been answered. I have discussed any challenges I see with this plan with the nurse or doctor.    ..........................................................................................................................................  Patient/Patient Representative Signature      ..........................................................................................................................................  Patient Representative Print Name and Relationship to Patient    ..................................................               ................................................  Date                                            Time    ..........................................................................................................................................  Reviewed by Signature/Title    ...................................................              ..............................................  Date                                                            Time

## 2018-01-01 NOTE — ED PROVIDER NOTES
"  History     Chief Complaint:  Foreign body in eye    HPI   Rafa Herring is an otherwise healthy 37 year old female who presents with foreign body in the eye. Patient was splitting firewood 3 hours ago, until she felt a foreign body sensation in her left eye. She has attempted an eye wash, but no improvement. Patient states pain worsens when she blinks. She does not wear contacts or glasses.     Allergies:  No known drug allergies     Medications:    Propanolol  Sumatriptan   Magnesium    Past Medical History:    The patient does not have any past pertinent medical history.     Past Surgical History:    Photorefractive keratectomy    Family History:    History reviewed. No pertinent family history.      Social History:  Smoking status: Never smoker  Alcohol use: None   Marital Status:   [2]  Accompanied by .      Review of Systems   Eyes: Positive for pain. Negative for visual disturbance.     Physical Exam   Patient Vitals for the past 24 hrs:   BP Temp Temp src Heart Rate Resp SpO2 Height Weight   12/31/17 1820 138/90 - - - - - - -   12/31/17 1816 138/90 98.2  F (36.8  C) Oral 71 18 100 % 1.676 m (5' 6\") 63.5 kg (140 lb)      Physical Exam  Eyes: Pupils equally round. Let eye was injected. No visible foreign object. Small corneal abrasion on the lateral eye.20/20 vision acuity.  HENT: Head is normal in appearance. Oropharynx is normal with moist mucus membranes.  Cardiovascular: Normal color of mucus membranes  Respiratory: Normal respiratory effort  Musculoskeletal: No asymmetry  Skin: Normal, without rash.  Lymphatic: No edema  Neurologic: Cranial nerves grossly intact, normal cognition, no apparent deficits.  Psychiatric: Normal affect.     Emergency Department Course   Emergency Department Course:  Past medical records, nursing notes, and vitals reviewed.  1812: I performed an exam of the patient and obtained history, as documented above.      1828: Vision acuity exam was performed. 20/20 in " each.     1830: I performed an eye exam on the patient and applied Tetracaine before wood's lamp exam, she tolerated it fine.     1903: I rechecked the patient.     1917: I rechecked the patient. Patient states that she is still feeling pain in her eye and that it still feels like there is something in her eye.     1933: I rechecked the patient. Findings and plan explained to the Patient and spouse. Patient discharged home with instructions regarding supportive care, medications, and reasons to return. The importance of close follow-up was reviewed.      Impression & Plan    Medical Decision Making:  Rafa Herring is a 37 year old female who presents for evaluation of a foreign body in the left eye.  I performed a wood's lamp exam, no evidence of a foreign body in the eye. Was irrigated using agueda lens, re-examined no FB, has small corneal abrasion. No signs of complications of the foreign body including abscess, cellulitis, necrotizing fascitis etc.  Will have them follow up with primary care for wound check.  Risk of infection discussed.  Recommend follow up with opthalmology in 24 hours. Patient verbalizes understanding. Discharged in stable condition.     Diagnosis:    ICD-10-CM   1. Eye foreign body, left, initial encounter T15.92XA   2. Abrasion of left cornea, initial encounter S05.02XA     Disposition:  discharged to home    Discharge Medications:   Details   HYDROcodone-acetaminophen (NORCO) 5-325 MG per tablet Take 1-2 tablets by mouth every 4 hours as needed for moderate to severe pain, Disp-15 tablet, R-0, Local Print      trimethoprim-polymyxin b (POLYTRIM) ophthalmic solution Place 1 drop Into the left eye every 3 hours for 7 days, Disp-1 Bottle, R-0, Local Print     Brandi Warren  12/31/2017   [facility] EMERGENCY DEPARTMENT  Brandi COKER Do, am serving as a scribe at 6:12 PM on 12/31/2017 to document services personally performed by Elmer Muller APRN C based on my observations and the  provider's statements to me.       Elmer Muller, APRN CNP  12/31/17 9163

## 2018-01-01 NOTE — ED NOTES
Pt has foreign body sensation in left eye which started while splitting wood 3 hours ago.  Pt attempted eye wash without improvement.

## 2018-01-01 NOTE — DISCHARGE INSTRUCTIONS
Corneal Abrasion    You have received a scratch or scrape (abrasion) to your cornea. The cornea is the clear part in the front of the eye. This sensitive area is very painful when injured. You may make tears frequently, and your vision may be blurry until the injury heals. You may be sensitive to light.  This part of the body heals quickly. You can expect the pain to go away within 24 to 48 hours. If the abrasion is large or deep, your doctor may apply an eye patch, although this is not always done. An antibiotic ointment or eye drops may also be used to prevent infection.  Numbing drops may be used to relieve the pain temporarily so that your eyes can be examined. But these drops can t be prescribed for home use because that would prevent healing and lead to more serious problems. Also, if you can t feel your eye, there is a chance of accidentally injuring it further without knowing it.  Home care    A cold pack may be applied over the eye (or eye patch) for 20 minutes at a time, to reduce pain. To make a cold pack, put ice cubes in a plastic bag that seals at the top. Wrap the bag in a clean, thin towel or cloth.    You may use acetaminophen or ibuprofen to control pain, unless another pain medicine was prescribed. Note: If you have chronic liver or kidney disease or have ever had a stomach ulcer or GI bleeding, talk with your doctor before using these medicines.    Rest your eyes and don t read until symptoms are gone.    If you use contact lenses, don t wear them until all symptoms are gone.    If your vision is affected by the corneal abrasion or if an eye patch was applied, don t drive a motor vehicle or operate machinery until all symptoms are gone. You may have trouble judging distances using only one eye.    If your eyes are sensitive to light, try wearing sunglasses, or stay indoors until symptoms go away.  Follow-up care  Follow up with your healthcare provider, or as advised.    If no patch was put on  your eye and the pain continues for more than 48 hours, you should have another exam. Contact your healthcare provider to arrange this.    If your eye was patched and you were asked to remove the patch yourself, see your healthcare provider. Contact your healthcare provider if you still have pain after the patch is removed.    If you were given a return appointment for patch removal and re-examination, be sure to keep the appointment. Leaving the patch in place longer than advised could be harmful.  When to seek medical advice  Call your healthcare provider right away if any of these occur.    Increasing eye pain or pain that does not improve after 24 hours    Discharge from the eye    Increasing redness of the eye or swelling of the eyelids    Worsening vision    Symptoms get worse after the abrasion has healed  Date Last Reviewed: 7/1/2017 2000-2017 [employer]. [address] [place] [address], [place] [address]. All rights reserved. This information is not intended as a substitute for professional medical care. Always follow your healthcare professional's instructions.

## 2018-01-05 PROBLEM — G43.909 MIGRAINE: Status: ACTIVE | Noted: 2018-01-05

## 2018-02-04 ENCOUNTER — MYC MEDICAL ADVICE (OUTPATIENT)
Dept: FAMILY MEDICINE | Facility: CLINIC | Age: 38
End: 2018-02-04

## 2018-02-04 DIAGNOSIS — G43.709 CHRONIC MIGRAINE WITHOUT AURA WITHOUT STATUS MIGRAINOSUS, NOT INTRACTABLE: ICD-10-CM

## 2018-02-05 RX ORDER — PROPRANOLOL HYDROCHLORIDE 10 MG/1
10 TABLET ORAL 2 TIMES DAILY
Qty: 180 TABLET | Refills: 1 | Status: SHIPPED | OUTPATIENT
Start: 2018-02-05 | End: 2019-01-25

## 2018-02-12 ENCOUNTER — HOSPITAL ENCOUNTER (EMERGENCY)
Facility: CLINIC | Age: 38
Discharge: HOME | End: 2018-02-12
Attending: EMERGENCY MEDICINE | Admitting: EMERGENCY MEDICINE
Payer: OTHER GOVERNMENT

## 2018-02-12 ENCOUNTER — APPOINTMENT (OUTPATIENT)
Dept: GENERAL RADIOLOGY | Facility: CLINIC | Age: 38
End: 2018-02-12
Attending: EMERGENCY MEDICINE
Payer: OTHER GOVERNMENT

## 2018-02-12 VITALS
SYSTOLIC BLOOD PRESSURE: 113 MMHG | HEART RATE: 109 BPM | RESPIRATION RATE: 16 BRPM | DIASTOLIC BLOOD PRESSURE: 69 MMHG | TEMPERATURE: 98.8 F | BODY MASS INDEX: 23.4 KG/M2 | OXYGEN SATURATION: 100 % | WEIGHT: 145 LBS

## 2018-02-12 DIAGNOSIS — G43.709 CHRONIC MIGRAINE WITHOUT AURA WITHOUT STATUS MIGRAINOSUS, NOT INTRACTABLE: ICD-10-CM

## 2018-02-12 DIAGNOSIS — J11.1 INFLUENZA-LIKE ILLNESS: ICD-10-CM

## 2018-02-12 LAB
FLUAV+FLUBV AG SPEC QL: NEGATIVE
FLUAV+FLUBV AG SPEC QL: NEGATIVE
SPECIMEN SOURCE: NORMAL

## 2018-02-12 PROCEDURE — 99284 EMERGENCY DEPT VISIT MOD MDM: CPT | Mod: 25

## 2018-02-12 PROCEDURE — 87804 INFLUENZA ASSAY W/OPTIC: CPT | Performed by: EMERGENCY MEDICINE

## 2018-02-12 PROCEDURE — 71046 X-RAY EXAM CHEST 2 VIEWS: CPT

## 2018-02-12 ASSESSMENT — ENCOUNTER SYMPTOMS
VOICE CHANGE: 1
COUGH: 1
MYALGIAS: 1
VOMITING: 0
ABDOMINAL PAIN: 0
FEVER: 1
SORE THROAT: 0
NAUSEA: 0
DIARRHEA: 0

## 2018-02-12 NOTE — ED PROVIDER NOTES
History     Chief Complaint:  Flu Symptoms    HPI   Rafa Herring is a 37 year old female who presents to the emergency department today with flu symptoms. The patient reports fever, chills, cough, myalgias and laryngitis for the last few days. About an hour and a half ago her temperature was 103.5. She took ibuprofen and it is currently 98.8. The patient denies ear pain, sore throat. She also denies vomiting, nausea, diarrhea.She denies abdominal pain or chest pain. The patient has been tylenol and Mucinex for her symptoms, with no relief. Of note, she did have an influenza shot this year.     Allergies:  No Known Drug Allergies     Medications:    Inderal  Norco  Imitrex  Magnesium    Past Medical History:    Migraine    Past Surgical History:    Photorefractive keratectomy    Family History:    Glaucoma    Social History:  The patient was accompanied by her .  Smoking Status: Never  Smokeless Tobacco: Never  Alcohol Use: No   Marital Status:       Review of Systems   Constitutional: Positive for fever.   HENT: Positive for congestion and voice change (loss of voice). Negative for sore throat.    Respiratory: Positive for cough.    Cardiovascular: Negative for chest pain.   Gastrointestinal: Negative for abdominal pain, diarrhea, nausea and vomiting.   Musculoskeletal: Positive for myalgias.   All other systems reviewed and are negative.    Physical Exam     Patient Vitals for the past 24 hrs:   BP Temp Temp src Pulse Heart Rate Resp SpO2 Weight   02/12/18 1853 - - - - - 16 - -   02/12/18 1851 113/69 - - - 102 - 100 % -   02/12/18 1709 110/84 98.8  F (37.1  C) Oral 109 - 18 98 % 65.8 kg (145 lb)      Physical Exam  General: Nasally congested, dry cough  HEENT:   Laryngitis, nasal congestion, posterior pharynx appears normal, not erythematous.The scalp and head appear normal    The pupils are equal, round, and reactive to light    Extraocular muscles are intact.    The nose is normal.    The  oropharynx is normal.      Uvula is in the midline.    Neck:  Normal range of motion.    Lungs:  Clear.      No rales, no wheezing.      There is no tachypnea.  Non-labored.  Cardiac: Regular rate.      Normal S1 and S2.      No pathological murmur/rub    Abdomen: Soft. No distension, no localized tenderness or rebound.  MS:  Normal tone. Normal movement of all extremities.   Neurologic:     Normal mentation.  No cranial nerve deficits.  No focal motor or sensory                            changes.      Speech normal.  Psych:  Awake.     Alert.      Normal affect.      Appropriate interactions.  Skin:  No rash.      No lesions.        Emergency Department Course   Imaging:  Radiology findings were communicated with the patient and family who voiced understanding of the findings.  Chest XR 2 views  IMPRESSION: Negative exam.  Report per radiology      Laboratory:  Laboratory findings were communicated with the patient and family who voiced understanding of the findings.  Influenza A/B Antigen: negative      Emergency Department Course:  Nursing notes and vitals reviewed.  1727: I performed an exam of the patient as documented above.   The patient was sent for a Chest XR while in the emergency department, results above.    1847: Findings and plan explained to the Patient and spouse. Patient discharged home with instructions regarding supportive care, medications, and reasons to return. The importance of close follow-up was reviewed.   I personally reviewed the laboratory and imaging results with the Patient and spouse and answered all related questions prior to discharge.   Impression & Plan      Medical Decision Making:  Rafa Herring is a 37 year old female who presents with congestive cough, nasal congestion, fever, chills, myalgias. Negative influenza antigens. Denies sore throat, vomiting, diarrhea. No abdominal pain. No chest pain. Just feels like she has chest congestion and can't clear it. The ER evaluation  reveals no lobar pneumonia, no pneumonia what-so-ever. Influenza antigens are negative, but they are not that sensitive, and this was discussed with her. I think she has a viral illness, it may be an influenza-like illness and acute bronchitis. She can continue her Mucinex DM as this is a good cough suppressant and expectorant. But she needs to increase clear liquids, she has not been doing that and she appeared modestly dehydrated. I think this will help her improve. In the meantime, she can alternate tylenol and ibuprofen. If she should develop severe respiratory distress, severe pain, vomiting, or any other new symptoms she should return in the interim. Otherwise follow up PCP if not improving significantly in the next 4-5 days.     Diagnosis:    ICD-10-CM    1. Influenza-like illness R69        Disposition:  discharged to home    Scribe Disclosure:  I, Camille Marie, am serving as a scribe at 5:21 PM on 2/12/2018 to document services personally performed by Grzegorz Correa MD based on my observations and the provider's statements to me.    2/12/2018   [facility] EMERGENCY DEPARTMENT       Grzegorz Correa MD  02/16/18 0855

## 2018-02-12 NOTE — ED AVS SNAPSHOT
[facility] Emergency Department    [address]    [facility] [address]    Phone:  [phone]    Fax:  [phone]                                       Rafa Herring   MRN: [id number]    Department:  [facility] Emergency Department   Date of Visit:  2/12/2018           After Visit Summary Signature Page     I have received my discharge instructions, and my questions have been answered. I have discussed any challenges I see with this plan with the nurse or doctor.    ..........................................................................................................................................  Patient/Patient Representative Signature      ..........................................................................................................................................  Patient Representative Print Name and Relationship to Patient    ..................................................               ................................................  Date                                            Time    ..........................................................................................................................................  Reviewed by Signature/Title    ...................................................              ..............................................  Date                                                            Time

## 2018-02-12 NOTE — ED AVS SNAPSHOT
[facility] Emergency Department    [address] [place] [address]    Phone:  [phone]    Fax:  [phone]                                       Rafa Herring   MRN: [id number]    Department:  [facility] Emergency Department   Date of Visit:  2/12/2018           Patient Information     Date Of Birth          1980        Your diagnoses for this visit were:     Influenza-like illness        You were seen by Grzegorz Correa MD.      Follow-up Information     Follow up with Alethea Edward Ra, APRN CNP In 4 days.    Specialty:  Family Practice    Why:  As needed    Contact information:    [address] [place] [address]  [phone]          Discharge Instructions       Discharge Instructions  Influenza    You were diagnosed today with influenza or influenza like illness.  Influenza is a respiratory illness caused by influenza A or B viruses.  Influenza causes fever, headache, muscle aches, and fatigue.  These symptoms start one to four days after you have been around a person with this illness.  People with influenza commonly have a dry cough, sore throat, and a runny nose.   Influenza is spread through sneezing and coughing and can live on surfaces for several days.  It is usually contagious for 5 days but in some cases up to 10 days and often affects several family members. If you have a family member who is less than 2 years old, older than 65 years old, pregnant or has a serious medical condition, they should be seen right away by a physician to decide if they should take preventative medications.      Return to the Emergency Department if:    You have trouble breathing.    You develop pain in your chest.    You have signs of being dehydrated, such as dizziness or unable to urinate at least three times daily.    You feel confused.    You cannot stop vomiting or you cannot drink enough fluids.    In children, you should seek help if the child has any  of the above or if child:    Has blue or purplish skin color.    Is so irritable that he or she does not want to be held.    Does not have tears when crying (in infants) or does not urinate at least three times daily.    Does not wake up easily.    Follow-up with your doctor if you are not improving after 5 days.    What can I do to help myself?    Rest.    Fluids -- Drink hydrating solutions such as Gatorade  or Pedialyte  as often as you can. If you are drinking enough, you should pass urine at least every eight hours.    Tylenol  (acetaminophen) and Advil  (ibuprofen) can relieve fever, headache, and muscle aches. Do not give aspirin to children under 18 years old.     Antiviral treatment -- Antiviral medicines do not make the flu symptoms go away immediately.  They have only been shown to make the symptoms go away 12 to 24 hours sooner than they would without treatment.       Antibiotics -- Antibiotics are NOT useful for treating viral illnesses such as influenza. Antibiotics should only be used if there is a bacterial complication of the flu such as bacterial pneumonia, ear infection, or sinusitis.    Because you were diagnosed with a flu like illness you are very contagious.  This means you cannot work, attend school or  for at least 24 hours or until you no longer have a fever.  If you were given a prescription for medicine here today, be sure to read all of the information (including the package insert) that comes with your prescription.  This will include important information about the medicine, its side effects, and any warnings that you need to know about.  The pharmacist who fills the prescription can provide more information and answer questions you may have about the medicine.  If you have questions or concerns that the pharmacist cannot address, please call or return to the Emergency Department.   Opioid Medication Information    Pain medications are among the most commonly prescribed  medicines, so we are including this information for all our patients. If you did not receive pain medication or get a prescription for pain medicine, you can ignore it.     You may have been given a prescription for an opioid (narcotic) pain medicine and/or have received a pain medicine while here in the Emergency Department. These medicines can make you drowsy or impaired. You must not drive, operate dangerous equipment, or engage in any other dangerous activities while taking these medications. If you drive while taking these medications, you could be arrested for DUI, or driving under the influence. Do not drink any alcohol while you are taking these medications.     Opioid pain medications can cause addiction. If you have a history of chemical dependency of any type, you are at a higher risk of becoming addicted to pain medications.  Only take these prescribed medications to treat your pain when all other options have been tried. Take it for as short a time and as few doses as possible. Store your pain pills in a secure place, as they are frequently stolen and provide a dangerous opportunity for children or visitors in your house to start abusing these powerful medications. We will not replace any lost or stolen medicine.  As soon as your pain is better, you should flush all your remaining medication.     Many prescription pain medications contain Tylenol  (acetaminophen), including Vicodin , Tylenol #3 , Norco , Lortab , and Percocet .  You should not take any extra pills of Tylenol  if you are using these prescription medications or you can get very sick.  Do not ever take more than 3000 mg of acetaminophen in any 24 hour period.    All opioids tend to cause constipation. Drink plenty of water and eat foods that have a lot of fiber, such as fruits, vegetables, prune juice, apple juice and high fiber cereal.  Take a laxative if you don t move your bowels at least every other day. Miralax , Milk of Magnesia,  Colace , or Senna  can be used to keep you regular.      Remember that you can always come back to the Emergency Department if you are not able to see your regular doctor in the amount of time listed above, if you get any new symptoms, or if there is anything that worries you.        24 Hour Appointment Hotline       To make an appointment at any [facility]3], call [phone] ([phone]). If you don't have a family doctor or clinic, we will help you find one. [facility] are conveniently located to serve the needs of you and your family.             Review of your medicines      Our records show that you are taking the medicines listed below. If these are incorrect, please call your family doctor or clinic.        Dose / Directions Last dose taken    HYDROcodone-acetaminophen 5-325 MG per tablet   Commonly known as:  NORCO   Dose:  1-2 tablet   Quantity:  15 tablet        Take 1-2 tablets by mouth every 4 hours as needed for moderate to severe pain   Refills:  0        magnesium 250 MG tablet   Dose:  1 tablet   Quantity:  30 tablet        Take 1 tablet by mouth daily   Refills:  0        propranolol 10 MG tablet   Commonly known as:  INDERAL   Dose:  10 mg   Quantity:  180 tablet        Take 1 tablet (10 mg) by mouth 2 times daily   Refills:  1        SUMAtriptan 25 MG tablet   Commonly known as:  IMITREX   Dose:  25-50 mg   Quantity:  9 tablet        Take 1-2 tablets (25-50 mg) by mouth at onset of headache for migraine May repeat in 2 hours. Max 8 tablets/24 hours.   Refills:  1                Procedures and tests performed during your visit     Influenza A/B antigen    XR Chest 2 Views      Orders Needing Specimen Collection     None      Pending Results     No orders found from 2/10/2018 to 2/13/2018.            Pending Culture Results     No orders found from 2/10/2018 to 2/13/2018.            Pending Results Instructions     If you had any lab results that were not finalized at the time of  your Discharge, you can call the ED Lab Result RN at [phone]. You will be contacted by this team for any positive Lab results or changes in treatment. The nurses are available 7 days a week from 10A to 6:30P.  You can leave a message 24 hours per day and they will return your call.        Test Results From Your Hospital Stay        2/12/2018  6:14 PM      Narrative     XR CHEST 2 VW 2/12/2018 6:08 PM     HISTORY: fever, cough;         Impression     IMPRESSION: Negative exam.    KITA TRIPLETT MD         2/12/2018  6:41 PM      Component Results     Component Value Ref Range & Units Status    Influenza A/B Agn Specimen Nose  Final    Influenza A Negative NEG^Negative Final    Influenza B Negative NEG^Negative Final    Test results must be correlated with clinical data. If necessary, results   should be confirmed by a molecular assay or viral culture.                  Clinical Quality Measure: Blood Pressure Screening     Your blood pressure was checked while you were in the emergency department today. The last reading we obtained was  BP: 110/84 . Please read the guidelines below about what these numbers mean and what you should do about them.  If your systolic blood pressure (the top number) is less than 120 and your diastolic blood pressure (the bottom number) is less than 80, then your blood pressure is normal. There is nothing more that you need to do about it.  If your systolic blood pressure (the top number) is 120-139 or your diastolic blood pressure (the bottom number) is 80-89, your blood pressure may be higher than it should be. You should have your blood pressure rechecked within a year by a primary care provider.  If your systolic blood pressure (the top number) is 140 or greater or your diastolic blood pressure (the bottom number) is 90 or greater, you may have high blood pressure. High blood pressure is treatable, but if left untreated over time it can put you at risk for heart attack, stroke, or  kidney failure. You should have your blood pressure rechecked by a primary care provider within the next 4 weeks.  If your provider in the emergency department today gave you specific instructions to follow-up with your doctor or provider even sooner than that, you should follow that instruction and not wait for up to 4 weeks for your follow-up visit.        Thank you for choosing [facility]       Thank you for choosing [facility] for your care. Our goal is always to provide you with excellent care. Hearing back from our patients is one way we can continue to improve our services. Please take a few minutes to complete the written survey that you may receive in the mail after you visit with us. Thank you!        [employer] Information     [employer]inkon gives you secure access to your electronic health record. If you see a primary care provider, you can also send messages to your care team and make appointments. If you have questions, please call your primary care clinic.  If you do not have a primary care provider, please call [phone] and they will assist you.        Care EveryWhere ID     This is your Care EveryWhere ID. This could be used by other organizations to access your [facility] medical records  [id number]        Equal Access to Services     PETE DENG : Hadii francis Calle, feliciano collins, lamberto zhu. So [facility] [phone].    ATENCIÓN: Si habla español, tiene a garg disposición servicios gratuitos de asistencia lingüística. Isauro al [phone].    We comply with applicable federal civil rights laws and [place] laws. We do not discriminate on the basis of race, color, national origin, age, disability, sex, sexual orientation, or gender identity.            After Visit Summary       This is your record. Keep this with you and show to your community pharmacist(s) and doctor(s) at your next visit.

## 2018-02-13 RX ORDER — PROPRANOLOL HYDROCHLORIDE 10 MG/1
TABLET ORAL
Qty: 60 TABLET | Refills: 1 | Status: SHIPPED | OUTPATIENT
Start: 2018-02-13 | End: 2018-04-17

## 2018-02-13 NOTE — TELEPHONE ENCOUNTER
Patient called back these should have been sent to the  [facility] not [facility], please resend to [facility]

## 2018-02-13 NOTE — TELEPHONE ENCOUNTER
Prescription approved per [facility] Refill Protocol.    Radha LAWRENCE RN, BSN, PHN  [facility] RN

## 2018-02-13 NOTE — TELEPHONE ENCOUNTER
"Requested Prescriptions   Pending Prescriptions Disp Refills     propranolol (INDERAL) 10 MG tablet [Pharmacy Med Name: PROPRANOLOL HCL 10MG TABS].  This medication may not be due for a refill  Last Written Prescription Date:  2/5/2018  Last Fill Quantity: 180 tablet,  # refills: 1   Last office visit: 12/18/2017 with prescribing provider:  Alethea Edward Ra   Future Office Visit:     60 tablet 1     Sig: TAKE ONE TABLET BY MOUTH TWICE A DAY    Beta-Blockers Protocol Failed    2/12/2018  3:42 PM       Failed - Blood pressure under 140/90    BP Readings from Last 3 Encounters:   02/12/18 110/84   12/31/17 138/90   12/26/17 102/68                Passed - Patient is age 6 or older       Passed - Recent or future visit with authorizing provider's specialty    Patient had office visit in the last year or has a visit in the next 30 days with authorizing provider.  See \"Patient Info\" tab in inbasket, or \"Choose Columns\" in Meds & Orders section of the refill encounter.           \  "

## 2018-02-13 NOTE — TELEPHONE ENCOUNTER
LM on  to call back.     This refill request is sent from [facility] pharmacy. Would like to clarify if this is a transfer request as refills were sent to [facility] on 2/5/2018      Radha LAWRENCE RN, BSN, PHN  [place] [facility] RN

## 2018-02-27 ENCOUNTER — ALLIED HEALTH/NURSE VISIT (OUTPATIENT)
Dept: NURSING | Facility: CLINIC | Age: 38
End: 2018-02-27
Payer: OTHER GOVERNMENT

## 2018-02-27 VITALS — DIASTOLIC BLOOD PRESSURE: 72 MMHG | OXYGEN SATURATION: 99 % | HEART RATE: 82 BPM | SYSTOLIC BLOOD PRESSURE: 104 MMHG

## 2018-02-27 DIAGNOSIS — G43.909 MIGRAINE: Primary | ICD-10-CM

## 2018-02-27 PROCEDURE — 99207 ZZC NO CHARGE NURSE ONLY: CPT

## 2018-02-27 NOTE — MR AVS SNAPSHOT
After Visit Summary   2/27/2018    Rafa Herrnig    MRN: [id number]           Patient Information     Date Of Birth          1980        Visit Information        Provider Department      2/27/2018 3:00 PM  NURSE [facility] Fely Martino        Today's Diagnoses     Migraine    -  1       Follow-ups after your visit        Who to contact     If you have questions or need follow up information about today's clinic visit or your schedule please contact [facility] directly at [phone].  Normal or non-critical lab and imaging results will be communicated to you by [employer], letter or phone within 4 business days after the clinic has received the results. If you do not hear from us within 7 days, please contact the clinic through [employer] or phone. If you have a critical or abnormal lab result, we will notify you by phone as soon as possible.  Submit refill requests through [employer] or call your pharmacy and they will forward the refill request to us. Please allow 3 business days for your refill to be completed.          Additional Information About Your Visit        MyChart Information     [employer] gives you secure access to your electronic health record. If you see a primary care provider, you can also send messages to your care team and make appointments. If you have questions, please call your primary care clinic.  If you do not have a primary care provider, please call [phone] and they will assist you.        Care EveryWhere ID     This is your Care EveryWhere ID. This could be used by other organizations to access your [facility] medical records  [id number]        Your Vitals Were     Pulse Pulse Oximetry                82 99%           Blood Pressure from Last 3 Encounters:   02/27/18 104/72   02/12/18 113/69   12/31/17 138/90    Weight from Last 3 Encounters:   02/12/18 145 lb (65.8 kg)   12/31/17 140 lb (63.5 kg)   12/18/17 145 lb 3.2 oz (65.9 kg)              Today,  you had the following     No orders found for display       Primary Care Provider Office Phone # Fax #    FINN Campos Ra [facility] [phone]       [address] [facility] [address]        Equal Access to Services     PETE DENG : Hadii aad ku hadasho Soomaali, waaxda luqadaha, qaybta kaalmada adeegyada, waxay idiin hayaan adeeg lashonda laedis . So [facility] [phone].    ATENCIÓN: Si habla español, tiene a garg disposición servicios gratuitos de asistencia lingüística. Llame al [phone].    We comply with applicable federal civil rights laws and [place] laws. We do not discriminate on the basis of race, color, national origin, age, disability, sex, sexual orientation, or gender identity.            Thank you!     Thank you for choosing [facility]  for your care. Our goal is always to provide you with excellent care. Hearing back from our patients is one way we can continue to improve our services. Please take a few minutes to complete the written survey that you may receive in the mail after your visit with us. Thank you!             Your Updated Medication List - Protect others around you: Learn how to safely use, store and throw away your medicines at www.disposemymeds.org.          This list is accurate as of 2/27/18  3:08 PM.  Always use your most recent med list.                   Brand Name Dispense Instructions for use Diagnosis    HYDROcodone-acetaminophen 5-325 MG per tablet    NORCO    15 tablet    Take 1-2 tablets by mouth every 4 hours as needed for moderate to severe pain        magnesium 250 MG tablet     30 tablet    Take 1 tablet by mouth daily        * propranolol 10 MG tablet    INDERAL    180 tablet    Take 1 tablet (10 mg) by mouth 2 times daily    Chronic migraine without aura without status migrainosus, not intractable       * propranolol 10 MG tablet    INDERAL    60 tablet    TAKE ONE TABLET BY MOUTH TWICE A DAY    Chronic migraine without aura  without status migrainosus, not intractable       SUMAtriptan 25 MG tablet    IMITREX    9 tablet    Take 1-2 tablets (25-50 mg) by mouth at onset of headache for migraine May repeat in 2 hours. Max 8 tablets/24 hours.    Chronic migraine without aura without status migrainosus, not intractable       * Notice:  This list has 2 medication(s) that are the same as other medications prescribed for you. Read the directions carefully, and ask your doctor or other care provider to review them with you.

## 2018-02-27 NOTE — NURSING NOTE
Rafa Herring is a 37 year old female who comes in today for a Blood Pressure check because of medication change-Inderal for migraines.    *Document pulse and BP  *Use new set of vitals button for multiple readings.  *Use extended vitals for orthostatic    Vitals as recorded, a regular cuff was used.    Patient is taking medication as prescribed  Patient is tolerating medications well.  Patient is not monitoring Blood Pressure at home.    Current complaints: none    Disposition: results routed to MD/AP and patient reminded to call as needed.  Paco Marcelo CMA ([facility])

## 2018-06-24 ENCOUNTER — OFFICE VISIT (OUTPATIENT)
Dept: URGENT CARE | Facility: URGENT CARE | Age: 38
End: 2018-06-24
Payer: OTHER GOVERNMENT

## 2018-06-24 VITALS
SYSTOLIC BLOOD PRESSURE: 106 MMHG | OXYGEN SATURATION: 97 % | TEMPERATURE: 98.6 F | DIASTOLIC BLOOD PRESSURE: 62 MMHG | HEART RATE: 72 BPM | BODY MASS INDEX: 23.73 KG/M2 | WEIGHT: 147 LBS

## 2018-06-24 DIAGNOSIS — J01.90 ACUTE SINUSITIS WITH SYMPTOMS > 10 DAYS: Primary | ICD-10-CM

## 2018-06-24 PROCEDURE — 99213 OFFICE O/P EST LOW 20 MIN: CPT | Performed by: PHYSICIAN ASSISTANT

## 2018-06-24 NOTE — PROGRESS NOTES
SUBJECTIVE:  Rafa Herring is a 37 year old female here with concerns about sinus infection.  She states onset of symptoms were 3 week(s) ago.  She has had maxillary pressure that is worse on the right.  States that started with a cough and then seemed to move up into sinus region and has not resolved.  Taking OTC med with no relief.  No hx of allergies or significant sinus issues.  No fevers.  Does have right sided associated dental pain. Course of illness is worsening. Severity moderate  Current and Associated symptoms: nasal congestion, rhinorrhea, facial pain/pressure, tooth pain, headache and post-nasal drainage  Predisposing factors include none. Recent treatment has included: Decongestants, OTC meds and Mucinex     Patient Active Problem List   Diagnosis     Migraine     Current Outpatient Prescriptions   Medication     magnesium 250 MG tablet     propranolol (INDERAL) 10 MG tablet     propranolol (INDERAL) 10 MG tablet     SUMAtriptan (IMITREX) 25 MG tablet     No current facility-administered medications for this visit.          No past medical history on file.  Social History   Substance Use Topics     Smoking status: Never Smoker     Smokeless tobacco: Never Used     Alcohol use No       ROS:  Review of systems negative except as stated above.    OBJECTIVE:  /62 (BP Location: Right arm, Patient Position: Chair, Cuff Size: Adult Regular)  Pulse 72  Temp 98.6  F (37  C) (Tympanic)  Wt 147 lb (66.7 kg)  SpO2 97%  BMI 23.73 kg/m2  Exam:GENERAL APPEARANCE: healthy, alert and no distress  EYES: EOMI,  PERRL, conjunctiva clear  HENT: TM's normal bilaterally, nasal turbinates erythematous, swollen, rhinorrhea yellow, oral mucous membranes moist, no erythema noted and maxillary sinus tenderness on the right.  NECK: supple, nontender, no lymphadenopathy  RESP: lungs clear to auscultation - no rales, rhonchi or wheezes  CV: regular rates and rhythm, normal S1 S2, no murmur noted  NEURO: Normal strength  and tone, sensory exam grossly normal,  normal speech and mentation  SKIN: no suspicious lesions or rashes    assessment/plan:  (J01.90) Acute sinusitis with symptoms > 10 days  (primary encounter diagnosis)  Comment:   Plan: amoxicillin-clavulanate (AUGMENTIN) 875-125 MG         per tablet           Med as directed and OTC med for sx relief, hot packs to face and steam.  Saline rinses or Neti pot if desires.  FU with PCP as needed if sx worsen or new sx develop.

## 2018-06-24 NOTE — MR AVS SNAPSHOT
After Visit Summary   6/24/2018    Rafa Herring    MRN: [id number]           Patient Information     Date Of Birth          1980        Visit Information        Provider Department      6/24/2018 10:15 AM Ann Perales PA-C [facility] [place] [facility]        Today's Diagnoses     Acute sinusitis with symptoms > 10 days    -  1       Follow-ups after your visit        Who to contact     If you have questions or need follow up information about today's clinic visit or your schedule please contact [facility] directly at [phone].  Normal or non-critical lab and imaging results will be communicated to you by [employer], letter or phone within 4 business days after the clinic has received the results. If you do not hear from us within 7 days, please contact the clinic through Cortex[employer]et or phone. If you have a critical or abnormal lab result, we will notify you by phone as soon as possible.  Submit refill requests through [employer] or call your pharmacy and they will forward the refill request to us. Please allow 3 business days for your refill to be completed.          Additional Information About Your Visit        MyChart Information     [employer] gives you secure access to your electronic health record. If you see a primary care provider, you can also send messages to your care team and make appointments. If you have questions, please call your primary care clinic.  If you do not have a primary care provider, please call [phone] and they will assist you.        Care EveryWhere ID     This is your Care EveryWhere ID. This could be used by other organizations to access your [place] medical records  [id number]        Your Vitals Were     Pulse Temperature Pulse Oximetry BMI (Body Mass Index)          72 98.6  F (37  C) (Tympanic) 97% 23.73 kg/m2         Blood Pressure from Last 3 Encounters:   06/24/18 106/62   02/27/18 104/72   02/12/18 113/69    Weight from Last 3  Encounters:   06/24/18 147 lb (66.7 kg)   02/12/18 145 lb (65.8 kg)   12/31/17 140 lb (63.5 kg)              Today, you had the following     No orders found for display         Today's Medication Changes          These changes are accurate as of 6/24/18 10:47 AM.  If you have any questions, ask your nurse or doctor.               Start taking these medicines.        Dose/Directions    amoxicillin-clavulanate 875-125 MG per tablet   Commonly known as:  AUGMENTIN   Used for:  Acute sinusitis with symptoms > 10 days   Started by:  Ann Perales PA-C        Dose:  1 tablet   Take 1 tablet by mouth 2 times daily   Quantity:  20 tablet   Refills:  0            Where to get your medicines      These medications were sent to [id number]  [place] - [address]  [address], [place] [address]     Phone:  [phone]     amoxicillin-clavulanate 875-125 MG per tablet                Primary Care Provider Office Phone # Fax #    Alethea Ralph Edward, APRN [facility] [phone]       [address]  [facility] [address]        Equal Access to Services     WENDY DENG AH: Hadii francis ku hadasho Soomaali, waaxda luqadaha, qaybta kaalmada adeegyada, lamberto idiin haypiton lorene dinh . So [facility] [phone].    ATENCIÓN: Si habla español, tiene a garg disposición servicios gratuitos de asistencia lingüística. Llame al [phone].    We comply with applicable federal civil rights laws and [place] laws. We do not discriminate on the basis of race, color, national origin, age, disability, sex, sexual orientation, or gender identity.            Thank you!     Thank you for choosing [facility]  for your care. Our goal is always to provide you with excellent care. Hearing back from our patients is one way we can continue to improve our services. Please take a few minutes to complete the written survey that you may receive in the mail  after your visit with us. Thank you!             Your Updated Medication List - Protect others around you: Learn how to safely use, store and throw away your medicines at www.disposemymeds.org.          This list is accurate as of 6/24/18 10:47 AM.  Always use your most recent med list.                   Brand Name Dispense Instructions for use Diagnosis    amoxicillin-clavulanate 875-125 MG per tablet    AUGMENTIN    20 tablet    Take 1 tablet by mouth 2 times daily    Acute sinusitis with symptoms > 10 days       magnesium 250 MG tablet     30 tablet    Take 1 tablet by mouth daily        * propranolol 10 MG tablet    INDERAL    180 tablet    Take 1 tablet (10 mg) by mouth 2 times daily    Chronic migraine without aura without status migrainosus, not intractable       * propranolol 10 MG tablet    INDERAL    180 tablet    Take 1 tablet (10 mg) by mouth 2 times daily    Chronic migraine without aura without status migrainosus, not intractable       SUMAtriptan 25 MG tablet    IMITREX    9 tablet    Take 1-2 tablets (25-50 mg) by mouth at onset of headache for migraine May repeat in 2 hours. Max 8 tablets/24 hours.    Chronic migraine without aura without status migrainosus, not intractable       * Notice:  This list has 2 medication(s) that are the same as other medications prescribed for you. Read the directions carefully, and ask your doctor or other care provider to review them with you.

## 2018-09-28 ENCOUNTER — ALLIED HEALTH/NURSE VISIT (OUTPATIENT)
Dept: NURSING | Facility: CLINIC | Age: 38
End: 2018-09-28
Payer: OTHER GOVERNMENT

## 2018-09-28 DIAGNOSIS — Z23 NEED FOR PROPHYLACTIC VACCINATION AND INOCULATION AGAINST INFLUENZA: Primary | ICD-10-CM

## 2018-09-28 PROCEDURE — 99207 ZZC NO CHARGE NURSE ONLY: CPT

## 2018-09-28 PROCEDURE — 90686 IIV4 VACC NO PRSV 0.5 ML IM: CPT

## 2018-09-28 PROCEDURE — 90471 IMMUNIZATION ADMIN: CPT

## 2018-09-28 NOTE — PROGRESS NOTES

## 2018-09-28 NOTE — MR AVS SNAPSHOT
After Visit Summary   9/28/2018    Rafa Herring    MRN: [id number]           Patient Information     Date Of Birth          1980        Visit Information        Provider Department      9/28/2018 10:10 AM  [facility] NURSE [facility]        Today's Diagnoses     Need for prophylactic vaccination and inoculation against influenza    -  1       Follow-ups after your visit        Who to contact     If you have questions or need follow up information about today's clinic visit or your schedule please contact [facility] directly at [phone].  Normal or non-critical lab and imaging results will be communicated to you by Ev[employer]x Technologieshart, letter or phone within 4 business days after the clinic has received the results. If you do not hear from us within 7 days, please contact the clinic through Ziliftt or phone. If you have a critical or abnormal lab result, we will notify you by phone as soon as possible.  Submit refill requests through [employer] or call your pharmacy and they will forward the refill request to us. Please allow 3 business days for your refill to be completed.          Additional Information About Your Visit        MyChart Information     [employer] gives you secure access to your electronic health record. If you see a primary care provider, you can also send messages to your care team and make appointments. If you have questions, please call your primary care clinic.  If you do not have a primary care provider, please call [phone] and they will assist you.        Care EveryWhere ID     This is your Care EveryWhere ID. This could be used by other organizations to access your [place] medical records  [id number]         Blood Pressure from Last 3 Encounters:   06/24/18 106/62   02/27/18 104/72   02/12/18 113/69    Weight from Last 3 Encounters:   06/24/18 147 lb (66.7 kg)   02/12/18 145 lb (65.8 kg)   12/31/17 140 lb (63.5 kg)              We Performed the  Following     FLU VACCINE, SPLIT VIRUS, IM (QUADRIVALENT) [17619]- >3 YRS     Vaccine Administration, Initial [50781]        Primary Care Provider Office Phone # Fax #    FINN Campos Ra [facility] [phone] [address]  [facility] [address]        Equal Access to Services     WENDY DENG : Hadii aad ku hadasho Soomaali, waaxda luqadaha, qaybta kaalmada adeegyada, waxay idiin hayaan adeeg kharash la'aan . So [facility] [phone].    ATENCIÓN: Si habla español, tiene a garg disposición servicios gratuitos de asistencia lingüística. Llame al [phone].    We comply with applicable federal civil rights laws and [place] laws. We do not discriminate on the basis of race, color, national origin, age, disability, sex, sexual orientation, or gender identity.            Thank you!     Thank you for choosing [facility]  for your care. Our goal is always to provide you with excellent care. Hearing back from our patients is one way we can continue to improve our services. Please take a few minutes to complete the written survey that you may receive in the mail after your visit with us. Thank you!             Your Updated Medication List - Protect others around you: Learn how to safely use, store and throw away your medicines at www.disposemymeds.org.          This list is accurate as of 9/28/18 10:14 AM.  Always use your most recent med list.                   Brand Name Dispense Instructions for use Diagnosis    amoxicillin-clavulanate 875-125 MG per tablet    AUGMENTIN    20 tablet    Take 1 tablet by mouth 2 times daily    Acute sinusitis with symptoms > 10 days       magnesium 250 MG tablet     30 tablet    Take 1 tablet by mouth daily        * propranolol 10 MG tablet    INDERAL    180 tablet    Take 1 tablet (10 mg) by mouth 2 times daily    Chronic migraine without aura without status migrainosus, not intractable       * propranolol 10 MG tablet    INDERAL    180 tablet     Take 1 tablet (10 mg) by mouth 2 times daily    Chronic migraine without aura without status migrainosus, not intractable       SUMAtriptan 25 MG tablet    IMITREX    9 tablet    Take 1-2 tablets (25-50 mg) by mouth at onset of headache for migraine May repeat in 2 hours. Max 8 tablets/24 hours.    Chronic migraine without aura without status migrainosus, not intractable       * Notice:  This list has 2 medication(s) that are the same as other medications prescribed for you. Read the directions carefully, and ask your doctor or other care provider to review them with you.

## 2019-01-25 ENCOUNTER — OFFICE VISIT (OUTPATIENT)
Dept: FAMILY MEDICINE | Facility: CLINIC | Age: 39
End: 2019-01-25
Payer: OTHER GOVERNMENT

## 2019-01-25 VITALS
HEIGHT: 66 IN | DIASTOLIC BLOOD PRESSURE: 68 MMHG | WEIGHT: 147.8 LBS | TEMPERATURE: 98.2 F | BODY MASS INDEX: 23.75 KG/M2 | OXYGEN SATURATION: 99 % | SYSTOLIC BLOOD PRESSURE: 98 MMHG | HEART RATE: 87 BPM | RESPIRATION RATE: 14 BRPM

## 2019-01-25 DIAGNOSIS — Z00.00 ENCOUNTER FOR ROUTINE ADULT HEALTH EXAMINATION WITHOUT ABNORMAL FINDINGS: Primary | ICD-10-CM

## 2019-01-25 DIAGNOSIS — G43.709 CHRONIC MIGRAINE WITHOUT AURA WITHOUT STATUS MIGRAINOSUS, NOT INTRACTABLE: ICD-10-CM

## 2019-01-25 DIAGNOSIS — Z12.31 VISIT FOR SCREENING MAMMOGRAM: ICD-10-CM

## 2019-01-25 PROCEDURE — 99395 PREV VISIT EST AGE 18-39: CPT | Performed by: NURSE PRACTITIONER

## 2019-01-25 PROCEDURE — 80048 BASIC METABOLIC PNL TOTAL CA: CPT | Performed by: NURSE PRACTITIONER

## 2019-01-25 PROCEDURE — 36415 COLL VENOUS BLD VENIPUNCTURE: CPT | Performed by: NURSE PRACTITIONER

## 2019-01-25 RX ORDER — PROPRANOLOL HYDROCHLORIDE 10 MG/1
10 TABLET ORAL 2 TIMES DAILY
Qty: 180 TABLET | Refills: 3 | Status: SHIPPED | OUTPATIENT
Start: 2019-01-25 | End: 2020-01-15

## 2019-01-25 ASSESSMENT — ENCOUNTER SYMPTOMS
ARTHRALGIAS: 0
CHILLS: 0
CONSTIPATION: 0
FEVER: 0
BREAST MASS: 0
WEAKNESS: 0
COUGH: 0
DIARRHEA: 0
PARESTHESIAS: 0
PALPITATIONS: 0
FREQUENCY: 0
HEADACHES: 0
JOINT SWELLING: 0
SHORTNESS OF BREATH: 0
SORE THROAT: 0
NERVOUS/ANXIOUS: 0
HEMATOCHEZIA: 0
MYALGIAS: 0
DIZZINESS: 0
ABDOMINAL PAIN: 0
NAUSEA: 0
EYE PAIN: 0
HEARTBURN: 0
HEMATURIA: 0
DYSURIA: 0

## 2019-01-25 ASSESSMENT — MIFFLIN-ST. JEOR: SCORE: 1366.14

## 2019-01-25 NOTE — PROGRESS NOTES
SUBJECTIVE:   CC: Rafa Herring is an 38 year old woman who presents for preventive health visit.     Physical   Annual:     Getting at least 3 servings of Calcium per day:  NO    Bi-annual eye exam:  Yes    Dental care twice a year:  Yes    Sleep apnea or symptoms of sleep apnea:  None    Diet:  Regular (no restrictions)    Frequency of exercise:  2-3 days/week    Duration of exercise:  Other    Taking medications regularly:  Yes    Medication side effects:  None    Additional concerns today:  YES (Refill on medication )    PHQ-2 Total Score: 0    Pt continues propranolol for migraines.  She does well with this.  Uses excedrin occasionally.  She would like to continue with this.      Today's PHQ-2 Score:   PHQ-2 ( 1999 Pfizer) 1/25/2019   Q1: Little interest or pleasure in doing things 0   Q2: Feeling down, depressed or hopeless 0   PHQ-2 Score 0   Q1: Little interest or pleasure in doing things Not at all   Q2: Feeling down, depressed or hopeless Not at all   PHQ-2 Score 0       Abuse: Current or Past(Physical, Sexual or Emotional)- No  Do you feel safe in your environment? Yes    Social History     Tobacco Use     Smoking status: Never Smoker     Smokeless tobacco: Never Used   Substance Use Topics     Alcohol use: No     Alcohol/week: 0.0 oz     Alcohol Use 1/25/2019   If you drink alcohol do you typically have greater than 3 drinks per day OR greater than 7 drinks per week? No       Reviewed orders with patient.  Reviewed health maintenance and updated orders accordingly - Yes  Patient Active Problem List   Diagnosis     Migraine     Past Surgical History:   Procedure Laterality Date     PHOTOREFRACTIVE KERATECTOMY  2004       Social History     Tobacco Use     Smoking status: Never Smoker     Smokeless tobacco: Never Used   Substance Use Topics     Alcohol use: No     Alcohol/week: 0.0 oz     Family History   Problem Relation Age of Onset     Glaucoma Maternal Grandmother      Family History Negative Mother  "     Family History Negative Father      Macular Degeneration No family hx of            Mammogram not appropriate for this patient based on age.    Pertinent mammograms are reviewed under the imaging tab.  History of abnormal Pap smear: NO - age 30-65 PAP every 5 years with negative HPV co-testing recommended  PAP / HPV Latest Ref Rng & Units 7/19/2017   PAP - NIL   HPV 16 DNA NEG Negative   HPV 18 DNA NEG Negative   OTHER HR HPV NEG Negative     Reviewed and updated as needed this visit by clinical staff  Tobacco  Allergies  Meds  Med Hx  Surg Hx  Fam Hx  Soc Hx        Reviewed and updated as needed this visit by Provider            Review of Systems   Constitutional: Negative for chills and fever.   HENT: Negative for congestion, ear pain, hearing loss and sore throat.    Eyes: Negative for pain and visual disturbance.   Respiratory: Negative for cough and shortness of breath.    Cardiovascular: Negative for chest pain, palpitations and peripheral edema.   Gastrointestinal: Negative for abdominal pain, constipation, diarrhea, heartburn, hematochezia and nausea.   Breasts:  Negative for tenderness, breast mass and discharge.   Genitourinary: Negative for dysuria, frequency, genital sores, hematuria, pelvic pain, urgency, vaginal bleeding and vaginal discharge.   Musculoskeletal: Negative for arthralgias, joint swelling and myalgias.   Skin: Negative for rash.   Neurological: Negative for dizziness, weakness, headaches and paresthesias.   Psychiatric/Behavioral: Negative for mood changes. The patient is not nervous/anxious.           OBJECTIVE:   BP 98/68 (BP Location: Right arm, Patient Position: Chair, Cuff Size: Adult Regular)   Pulse 87   Temp 98.2  F (36.8  C) (Tympanic)   Resp 14   Ht 1.683 m (5' 6.25\")   Wt 67 kg (147 lb 12.8 oz)   LMP 01/19/2019 (Exact Date)   SpO2 99%   BMI 23.68 kg/m    Physical Exam  GENERAL: healthy, alert and no distress  EYES: Eyes grossly normal to inspection, PERRL and " "conjunctivae and sclerae normal  HENT: ear canals and TM's normal, nose and mouth without ulcers or lesions  NECK: no adenopathy, no asymmetry, masses, or scars and thyroid normal to palpation  RESP: lungs clear to auscultation - no rales, rhonchi or wheezes  BREAST: normal without masses, tenderness or nipple discharge and no palpable axillary masses or adenopathy  CV: regular rate and rhythm, normal S1 S2, no S3 or S4, no murmur, click or rub, no peripheral edema and peripheral pulses strong  ABDOMEN: soft, nontender, no hepatosplenomegaly, no masses and bowel sounds normal  NEURO: Normal strength and tone, mentation intact and speech normal  PSYCH: mentation appears normal, affect normal/bright    Diagnostic Test Results:  none     ASSESSMENT/PLAN:   1. Encounter for routine adult health examination without abnormal findings    2. Chronic migraine without aura without status migrainosus, not intractable  Stable, refilled.  - propranolol (INDERAL) 10 MG tablet; Take 1 tablet (10 mg) by mouth 2 times daily  Dispense: 180 tablet; Refill: 3  - Basic metabolic panel    3. Visit for screening mammogram  - *MA Screening Digital Bilateral; Future    COUNSELING:  Reviewed preventive health counseling, as reflected in patient instructions    BP Readings from Last 1 Encounters:   01/25/19 98/68     Estimated body mass index is 23.68 kg/m  as calculated from the following:    Height as of this encounter: 1.683 m (5' 6.25\").    Weight as of this encounter: 67 kg (147 lb 12.8 oz).           reports that  has never smoked. she has never used smokeless tobacco.      Counseling Resources:  ATP IV Guidelines  Pooled Cohorts Equation Calculator  Breast Cancer Risk Calculator  FRAX Risk Assessment  ICSI Preventive Guidelines  Dietary Guidelines for Americans, 2010  [employer]'s MyPlate  ASA Prophylaxis  Lung CA Screening    Alethea Edward, FINN [facility]  "

## 2019-01-26 LAB
ANION GAP SERPL CALCULATED.3IONS-SCNC: 6 MMOL/L (ref 3–14)
BUN SERPL-MCNC: 19 MG/DL (ref 7–30)
CALCIUM SERPL-MCNC: 9.4 MG/DL (ref 8.5–10.1)
CHLORIDE SERPL-SCNC: 107 MMOL/L (ref 94–109)
CO2 SERPL-SCNC: 27 MMOL/L (ref 20–32)
CREAT SERPL-MCNC: 0.86 MG/DL (ref 0.52–1.04)
GFR SERPL CREATININE-BSD FRML MDRD: 85 ML/MIN/{1.73_M2}
GLUCOSE SERPL-MCNC: 86 MG/DL (ref 70–99)
POTASSIUM SERPL-SCNC: 3.7 MMOL/L (ref 3.4–5.3)
SODIUM SERPL-SCNC: 140 MMOL/L (ref 133–144)

## 2019-08-23 ENCOUNTER — TELEPHONE (OUTPATIENT)
Dept: FAMILY MEDICINE | Facility: CLINIC | Age: 39
End: 2019-08-23

## 2019-08-23 NOTE — TELEPHONE ENCOUNTER
Reason for call:  Form   Our goal is to have forms completed within 72 hours, however some forms may require a visit or additional information.     Who is the form from? Patient  Where did the form come from? Patient or family brought in     What clinic location was the form placed at? [facility]  Where was the form placed? evan rodriguez Box/Folder  What number is listed as a contact on the form? [phone]    Phone call message - patient request for a letter, form or note:     Date needed: at your convenience  Please mail to [phone] [facility] [address]  Has the patient signed a consent form for release of information? Not Applicable    Additional comments: none    Type of letter, form or note: medical    Phone number to reach patient:  Home number on file [phone] (home)    Best Time:  any    Can we leave a detailed message on this number?  YES

## 2019-09-11 ENCOUNTER — ALLIED HEALTH/NURSE VISIT (OUTPATIENT)
Dept: NURSING | Facility: CLINIC | Age: 39
End: 2019-09-11
Payer: OTHER GOVERNMENT

## 2019-09-11 DIAGNOSIS — Z23 NEED FOR PROPHYLACTIC VACCINATION AND INOCULATION AGAINST INFLUENZA: Primary | ICD-10-CM

## 2019-09-11 PROCEDURE — 99207 ZZC NO CHARGE NURSE ONLY: CPT

## 2019-09-11 PROCEDURE — 90471 IMMUNIZATION ADMIN: CPT

## 2019-09-11 PROCEDURE — 90686 IIV4 VACC NO PRSV 0.5 ML IM: CPT

## 2019-10-18 ENCOUNTER — HOSPITAL ENCOUNTER (OUTPATIENT)
Dept: MAMMOGRAPHY | Facility: CLINIC | Age: 39
Discharge: HOME | End: 2019-10-18
Attending: NURSE PRACTITIONER | Admitting: NURSE PRACTITIONER
Payer: OTHER GOVERNMENT

## 2019-10-18 DIAGNOSIS — Z12.31 VISIT FOR SCREENING MAMMOGRAM: ICD-10-CM

## 2019-10-18 PROCEDURE — 77063 BREAST TOMOSYNTHESIS BI: CPT

## 2019-10-31 ENCOUNTER — HOSPITAL ENCOUNTER (OUTPATIENT)
Dept: ULTRASOUND IMAGING | Facility: CLINIC | Age: 39
Discharge: HOME | End: 2019-10-31
Attending: NURSE PRACTITIONER | Admitting: NURSE PRACTITIONER
Payer: OTHER GOVERNMENT

## 2019-10-31 DIAGNOSIS — R92.8 ABNORMAL MAMMOGRAM: ICD-10-CM

## 2019-10-31 PROCEDURE — 76642 ULTRASOUND BREAST LIMITED: CPT | Mod: 50

## 2020-01-15 ENCOUNTER — OFFICE VISIT (OUTPATIENT)
Dept: FAMILY MEDICINE | Facility: CLINIC | Age: 40
End: 2020-01-15
Payer: OTHER GOVERNMENT

## 2020-01-15 VITALS
HEART RATE: 79 BPM | TEMPERATURE: 98.3 F | BODY MASS INDEX: 22.63 KG/M2 | WEIGHT: 140.8 LBS | RESPIRATION RATE: 16 BRPM | HEIGHT: 66 IN | OXYGEN SATURATION: 100 % | SYSTOLIC BLOOD PRESSURE: 106 MMHG | DIASTOLIC BLOOD PRESSURE: 62 MMHG

## 2020-01-15 DIAGNOSIS — G43.709 CHRONIC MIGRAINE WITHOUT AURA WITHOUT STATUS MIGRAINOSUS, NOT INTRACTABLE: Primary | ICD-10-CM

## 2020-01-15 PROCEDURE — 99213 OFFICE O/P EST LOW 20 MIN: CPT | Performed by: NURSE PRACTITIONER

## 2020-01-15 RX ORDER — PROPRANOLOL HYDROCHLORIDE 10 MG/1
10 TABLET ORAL 2 TIMES DAILY
Qty: 180 TABLET | Refills: 3 | Status: SHIPPED | OUTPATIENT
Start: 2020-01-15 | End: 2020-02-11

## 2020-01-15 ASSESSMENT — MIFFLIN-ST. JEOR: SCORE: 1329.38

## 2020-01-15 NOTE — PROGRESS NOTES
"Subjective     Rafa Herring is a 39 year old female who presents to clinic today for the following health issues:    HPI   Pt here for med check.  She continues to do well with propranolol.  No sob, palpitations, chest pain.  She has been exercising and has lost some weight.  She does have headaches a few times per month despite the propranolol.  This is still a big improvement but unsure if there is something more to help.  She has not been able to identify specific food triggers.  She does believe some of her symptoms arise from tension in her neck.    Patient Active Problem List   Diagnosis     Migraine     Past Surgical History:   Procedure Laterality Date     PHOTOREFRACTIVE KERATECTOMY  2004       Social History     Tobacco Use     Smoking status: Never Smoker     Smokeless tobacco: Never Used   Substance Use Topics     Alcohol use: No     Alcohol/week: 0.0 standard drinks     Family History   Problem Relation Age of Onset     Glaucoma Maternal Grandmother      Family History Negative Mother      Family History Negative Father      Macular Degeneration No family hx of              Reviewed and updated as needed this visit by Provider  Tobacco  Allergies  Meds  Problems  Med Hx  Surg Hx  Fam Hx         Review of Systems   SEE HPI.      Objective    /62 (BP Location: Right arm, Patient Position: Chair, Cuff Size: Adult Regular)   Pulse 79   Temp 98.3  F (36.8  C) (Tympanic)   Resp 16   Ht 1.683 m (5' 6.25\")   Wt 63.9 kg (140 lb 12.8 oz)   LMP 01/04/2020 (Exact Date)   SpO2 100%   BMI 22.55 kg/m    Body mass index is 22.55 kg/m .  Physical Exam   GENERAL: healthy, alert and no distress  RESP: lungs clear to auscultation - no rales, rhonchi or wheezes  CV: regular rate and rhythm, normal S1 S2, no S3 or S4, no murmur, click or rub, no peripheral edema and peripheral pulses strong  CV: regular rates and rhythm, normal S1 S2, no S3 or S4, no murmur, click or rub, peripheral pulses strong and no " peripheral edema  PSYCH:  Alert & oriented.    Diagnostic Test Results:  Labs reviewed in Epic        Assessment & Plan     1. Chronic migraine without aura without status migrainosus, not intractable  Stable on propranolol.  Let's have her see [place] [facility] for further assessment related to trigger of tension.  Pt agrees with plan and verbalized understanding.  - OTOLARYNGOLOGY REFERRAL  - propranolol (INDERAL) 10 MG tablet; Take 1 tablet (10 mg) by mouth 2 times daily  Dispense: 180 tablet; Refill: 3      Return in about 3 months (around 4/15/2020) for Physical Exam.    Alethea Edward, APRN CNP  [facility]

## 2020-02-11 ENCOUNTER — TRANSFERRED RECORDS (OUTPATIENT)
Dept: HEALTH INFORMATION MANAGEMENT | Facility: CLINIC | Age: 40
End: 2020-02-11

## 2020-02-11 DIAGNOSIS — G43.709 CHRONIC MIGRAINE WITHOUT AURA WITHOUT STATUS MIGRAINOSUS, NOT INTRACTABLE: ICD-10-CM

## 2020-02-11 RX ORDER — PROPRANOLOL HYDROCHLORIDE 10 MG/1
TABLET ORAL
Qty: 180 TABLET | Refills: 3 | Status: SHIPPED | OUTPATIENT
Start: 2020-02-11 | End: 2022-07-28

## 2020-02-11 NOTE — TELEPHONE ENCOUNTER
Prescription approved per [facility] protocol.    Laureen Parra RN on 2/11/2020 at 5:42 PM

## 2020-02-11 NOTE — TELEPHONE ENCOUNTER
"Requested Prescriptions   Pending Prescriptions Disp Refills     propranolol (INDERAL) 10 MG tablet [Pharmacy Med Name: PROPRANOLOL HCL 10MG TABS] 180 tablet 3     Sig: TAKE ONE TABLET BY MOUTH TWICE A DAY   Last Written Prescription Date:  1/15/20  Last Fill Quantity: 180,  # refills: 3   Last office visit: 1/15/2020 with prescribing provider:  Alethea Edward Ra, APRN CNP   Future Office Visit:        Beta-Blockers Protocol Passed - 2/11/2020  6:38 AM        Passed - Blood pressure under 140/90 in past 12 months     BP Readings from Last 3 Encounters:   01/15/20 106/62   01/25/19 98/68   06/24/18 106/62                 Passed - Patient is age 6 or older        Passed - Recent (12 mo) or future (30 days) visit within the authorizing provider's specialty     Patient has had an office visit with the authorizing provider or a provider within the authorizing providers department within the previous 12 mos or has a future within next 30 days. See \"Patient Info\" tab in inbasket, or \"Choose Columns\" in Meds & Orders section of the refill encounter.              Passed - Medication is active on med list         "

## 2020-03-01 ENCOUNTER — HEALTH MAINTENANCE LETTER (OUTPATIENT)
Age: 40
End: 2020-03-01

## 2020-11-04 ENCOUNTER — OFFICE VISIT (OUTPATIENT)
Dept: FAMILY MEDICINE | Facility: CLINIC | Age: 40
End: 2020-11-04
Payer: OTHER GOVERNMENT

## 2020-11-04 VITALS
DIASTOLIC BLOOD PRESSURE: 66 MMHG | WEIGHT: 150.2 LBS | RESPIRATION RATE: 14 BRPM | SYSTOLIC BLOOD PRESSURE: 102 MMHG | HEART RATE: 68 BPM | OXYGEN SATURATION: 100 % | HEIGHT: 68 IN | TEMPERATURE: 98.2 F | BODY MASS INDEX: 22.76 KG/M2

## 2020-11-04 DIAGNOSIS — R00.2 PALPITATIONS: Primary | ICD-10-CM

## 2020-11-04 DIAGNOSIS — Z23 NEED FOR PROPHYLACTIC VACCINATION AND INOCULATION AGAINST INFLUENZA: ICD-10-CM

## 2020-11-04 LAB
ERYTHROCYTE [DISTWIDTH] IN BLOOD BY AUTOMATED COUNT: 12 % (ref 10–15)
HCT VFR BLD AUTO: 34.7 % (ref 35–47)
HGB BLD-MCNC: 11.1 G/DL (ref 11.7–15.7)
MCH RBC QN AUTO: 29.5 PG (ref 26.5–33)
MCHC RBC AUTO-ENTMCNC: 32 G/DL (ref 31.5–36.5)
MCV RBC AUTO: 92 FL (ref 78–100)
PLATELET # BLD AUTO: 242 10E9/L (ref 150–450)
RBC # BLD AUTO: 3.76 10E12/L (ref 3.8–5.2)
WBC # BLD AUTO: 6.4 10E9/L (ref 4–11)

## 2020-11-04 PROCEDURE — 93000 ELECTROCARDIOGRAM COMPLETE: CPT | Performed by: NURSE PRACTITIONER

## 2020-11-04 PROCEDURE — 90471 IMMUNIZATION ADMIN: CPT | Performed by: NURSE PRACTITIONER

## 2020-11-04 PROCEDURE — 90686 IIV4 VACC NO PRSV 0.5 ML IM: CPT | Performed by: NURSE PRACTITIONER

## 2020-11-04 PROCEDURE — 85027 COMPLETE CBC AUTOMATED: CPT | Performed by: NURSE PRACTITIONER

## 2020-11-04 PROCEDURE — 99214 OFFICE O/P EST MOD 30 MIN: CPT | Mod: 25 | Performed by: NURSE PRACTITIONER

## 2020-11-04 PROCEDURE — 36415 COLL VENOUS BLD VENIPUNCTURE: CPT | Performed by: NURSE PRACTITIONER

## 2020-11-04 PROCEDURE — 80053 COMPREHEN METABOLIC PANEL: CPT | Performed by: NURSE PRACTITIONER

## 2020-11-04 PROCEDURE — 84443 ASSAY THYROID STIM HORMONE: CPT | Performed by: NURSE PRACTITIONER

## 2020-11-04 ASSESSMENT — MIFFLIN-ST. JEOR: SCORE: 1386.86

## 2020-11-04 NOTE — PROGRESS NOTES
Subjective     Rafa Herring is a 40 year old female who presents to clinic today for the following health issues:    HPI         Palpitations  Onset/Duration: Ongoing for a Month  Description:   Location: Center of Chest  Character: Unusual Sensation-No Pain  Radiation: None  Duration: Sensations Lasts a couple seconds occurring several times a day. Occur randomly.    Intensity: No Pain  Progression of Symptoms: Several Times a Days  Accompanying Signs & Symptoms:  Shortness of breath: no  Sweating: no  Nausea/vomiting: no  Lightheadedness: Dizziness occurs upon symptoms  Palpitations: YES  Fever/Chills: no  Cough: no           Heartburn: Rarely experiences heartburn attributed to food.  History:   Family history of heart disease: Dad just dx with a factor 2 clotting factor mutation  Tobacco use: no  Previous similar symptoms: YES- Has had sporadic and random palpitations in the past.   Precipitating factors:   Worse with exertion: no  Worse with deep breaths: no           Related to eating: no           Better with burping: no  Alleviating factors: Nothing   Therapies tried and outcome: None    First noticed a little over 1 month ago.   Period of about 15 minutes when her heart felt like it was not beating correctly.  She denies pain or breathing issues.  No change in sleep.  No change in stress.  Normal intake.  She is active and this does not cause symptoms to occur.  She remembers being evaluated for this many years ago when in the .  No follow up was needed and it was thought to be due to stress.  She remembers there was a lot of stress at that time.  Symptoms now occur throughout the day lasting minutes.  She does take propranolol for headaches and this has remained unchanged.  She does note that when she started this she felt occasional palpitations but felt that the medication was helpful.    Review of Systems   Constitutional, HEENT, cardiovascular, pulmonary, gi and gu systems are negative,  "except as otherwise noted.      Objective    /66 (BP Location: Right arm, Patient Position: Chair, Cuff Size: Adult Large)   Pulse 68   Temp 98.2  F (36.8  C) (Oral)   Resp 14   Ht 1.715 m (5' 7.5\")   Wt 68.1 kg (150 lb 3.2 oz)   SpO2 100%   BMI 23.18 kg/m    Body mass index is 23.18 kg/m .  Physical Exam   GENERAL: healthy, alert and no distress  NECK: no adenopathy, no asymmetry, masses, or scars and thyroid normal to palpation  RESP: lungs clear to auscultation - no rales, rhonchi or wheezes  CV: regular rate and rhythm, normal S1 S2, no S3 or S4, no murmur, click or rub, no peripheral edema and peripheral pulses strong  ABDOMEN: soft, nontender, no hepatosplenomegaly, no masses and bowel sounds normal  PSYCH: mentation appears normal, affect normal/bright    EKG wnl.        Assessment & Plan     Palpitations  No additional symptoms.  Discussed monitoring and further eval with labs and holter and echo.  Pt agrees with plan and verbalized understanding.  - Holter Monitor 48 hour Adult Pediatric; Future  - Echocardiogram Complete; Future  - TSH with free T4 reflex  - CBC with platelets  - Comprehensive metabolic panel (BMP + Alb, Alk Phos, ALT, AST, Total. Bili, TP)  - EKG 12-lead complete w/read - Clinics          Return in about 4 weeks (around 12/2/2020) for follow up.    Alethea Edward, FINN NEELY  [facility] [place]    "

## 2020-11-05 LAB
ALBUMIN SERPL-MCNC: 4 G/DL (ref 3.4–5)
ALP SERPL-CCNC: 57 U/L (ref 40–150)
ALT SERPL W P-5'-P-CCNC: 49 U/L (ref 0–50)
ANION GAP SERPL CALCULATED.3IONS-SCNC: 4 MMOL/L (ref 3–14)
AST SERPL W P-5'-P-CCNC: 27 U/L (ref 0–45)
BILIRUB SERPL-MCNC: 0.3 MG/DL (ref 0.2–1.3)
BUN SERPL-MCNC: 19 MG/DL (ref 7–30)
CALCIUM SERPL-MCNC: 9.6 MG/DL (ref 8.5–10.1)
CHLORIDE SERPL-SCNC: 106 MMOL/L (ref 94–109)
CO2 SERPL-SCNC: 28 MMOL/L (ref 20–32)
CREAT SERPL-MCNC: 0.87 MG/DL (ref 0.52–1.04)
GFR SERPL CREATININE-BSD FRML MDRD: 83 ML/MIN/{1.73_M2}
GLUCOSE SERPL-MCNC: 93 MG/DL (ref 70–99)
POTASSIUM SERPL-SCNC: 4 MMOL/L (ref 3.4–5.3)
PROT SERPL-MCNC: 7.7 G/DL (ref 6.8–8.8)
SODIUM SERPL-SCNC: 138 MMOL/L (ref 133–144)
TSH SERPL DL<=0.005 MIU/L-ACNC: 0.76 MU/L (ref 0.4–4)

## 2020-11-10 DIAGNOSIS — D64.9 ANEMIA, UNSPECIFIED TYPE: Primary | ICD-10-CM

## 2020-11-13 ENCOUNTER — HOSPITAL ENCOUNTER (OUTPATIENT)
Dept: MAMMOGRAPHY | Facility: CLINIC | Age: 40
Discharge: HOME | End: 2020-11-13
Attending: NURSE PRACTITIONER | Admitting: NURSE PRACTITIONER
Payer: OTHER GOVERNMENT

## 2020-11-13 DIAGNOSIS — Z12.31 VISIT FOR SCREENING MAMMOGRAM: ICD-10-CM

## 2020-11-13 PROCEDURE — 77067 SCR MAMMO BI INCL CAD: CPT

## 2020-11-17 ENCOUNTER — HOSPITAL ENCOUNTER (OUTPATIENT)
Dept: CARDIOLOGY | Facility: CLINIC | Age: 40
End: 2020-11-17
Attending: NURSE PRACTITIONER
Payer: OTHER GOVERNMENT

## 2020-11-17 DIAGNOSIS — R00.2 PALPITATIONS: ICD-10-CM

## 2020-11-17 PROCEDURE — 93227 XTRNL ECG REC<48 HR R&I: CPT | Performed by: INTERNAL MEDICINE

## 2020-11-17 PROCEDURE — 93225 XTRNL ECG REC<48 HRS REC: CPT

## 2020-11-19 ENCOUNTER — HOSPITAL ENCOUNTER (OUTPATIENT)
Dept: CARDIOLOGY | Facility: CLINIC | Age: 40
End: 2020-11-19
Attending: NURSE PRACTITIONER
Payer: OTHER GOVERNMENT

## 2020-11-19 DIAGNOSIS — R00.2 PALPITATIONS: ICD-10-CM

## 2020-11-19 PROCEDURE — 93306 TTE W/DOPPLER COMPLETE: CPT

## 2020-11-19 PROCEDURE — 93306 TTE W/DOPPLER COMPLETE: CPT | Mod: 26 | Performed by: INTERNAL MEDICINE

## 2020-11-20 DIAGNOSIS — D64.9 ANEMIA, UNSPECIFIED TYPE: ICD-10-CM

## 2020-11-20 LAB — VIT B12 SERPL-MCNC: 720 PG/ML (ref 193–986)

## 2020-11-20 PROCEDURE — 82728 ASSAY OF FERRITIN: CPT | Performed by: NURSE PRACTITIONER

## 2020-11-20 PROCEDURE — 83550 IRON BINDING TEST: CPT | Performed by: NURSE PRACTITIONER

## 2020-11-20 PROCEDURE — 83540 ASSAY OF IRON: CPT | Performed by: NURSE PRACTITIONER

## 2020-11-20 PROCEDURE — 82607 VITAMIN B-12: CPT | Performed by: NURSE PRACTITIONER

## 2020-11-20 PROCEDURE — 36415 COLL VENOUS BLD VENIPUNCTURE: CPT | Performed by: NURSE PRACTITIONER

## 2020-11-21 LAB
FERRITIN SERPL-MCNC: 27 NG/ML (ref 12–150)
IRON SATN MFR SERPL: 21 % (ref 15–46)
IRON SERPL-MCNC: 78 UG/DL (ref 35–180)
TIBC SERPL-MCNC: 370 UG/DL (ref 240–430)

## 2020-11-25 DIAGNOSIS — D64.9 ANEMIA, UNSPECIFIED TYPE: Primary | ICD-10-CM

## 2020-11-30 ENCOUNTER — MYC MEDICAL ADVICE (OUTPATIENT)
Dept: FAMILY MEDICINE | Facility: CLINIC | Age: 40
End: 2020-11-30

## 2020-11-30 DIAGNOSIS — R00.2 PALPITATIONS: Primary | ICD-10-CM

## 2020-12-04 ENCOUNTER — OFFICE VISIT (OUTPATIENT)
Dept: CARDIOLOGY | Facility: CLINIC | Age: 40
End: 2020-12-04
Attending: NURSE PRACTITIONER
Payer: OTHER GOVERNMENT

## 2020-12-04 VITALS
DIASTOLIC BLOOD PRESSURE: 76 MMHG | BODY MASS INDEX: 24.32 KG/M2 | SYSTOLIC BLOOD PRESSURE: 106 MMHG | WEIGHT: 151.3 LBS | HEIGHT: 66 IN | HEART RATE: 62 BPM

## 2020-12-04 DIAGNOSIS — R00.2 PALPITATIONS: ICD-10-CM

## 2020-12-04 PROCEDURE — 99203 OFFICE O/P NEW LOW 30 MIN: CPT | Performed by: INTERNAL MEDICINE

## 2020-12-04 SDOH — HEALTH STABILITY: MENTAL HEALTH: HOW OFTEN DO YOU HAVE 6 OR MORE DRINKS ON ONE OCCASION?: NOT ASKED

## 2020-12-04 SDOH — HEALTH STABILITY: MENTAL HEALTH: HOW MANY STANDARD DRINKS CONTAINING ALCOHOL DO YOU HAVE ON A TYPICAL DAY?: NOT ASKED

## 2020-12-04 SDOH — HEALTH STABILITY: MENTAL HEALTH: HOW OFTEN DO YOU HAVE A DRINK CONTAINING ALCOHOL?: 2-4 TIMES A MONTH

## 2020-12-04 ASSESSMENT — MIFFLIN-ST. JEOR: SCORE: 1368.04

## 2020-12-04 NOTE — LETTER
12/4/2020      Alethea Edward, APRN CNP  [id number] [place] [address]      RE: Rafa Herring       Dear Colleague,    I had the pleasure of seeing Rafa NORMAN Herring in the [facility].    Service Date: 12/04/2020      HISTORY OF PRESENT ILLNESS:  It is a pleasure for me to see this very delightful, 40-year-old lady for assessment of palpitations.  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  She had a cardiac evaluation and was told that that was normal.  Over the past few months, she has been experiencing palpitations.  She describes it as a skipped beat followed by a more forceful heartbeat.  This lasts for a split second.  She notices it more when she is sitting down and also at night.  Activity diminishes these symptoms.  She has no exertional chest discomfort or shortness of breath.  No history of smoking, drug or alcohol abuse.  No history of thyroid disease.  Symptoms have been increasing a little bit more recently.  She had a Holter monitor, which showed about 300 PVCs, which were well within the normal range.  It constituted less than 1% of all heartbeats.  A Holter shows essentially a structurally normal heart.  There was mention of a possible bicuspid aortic valve, though no significant regurgitation or stenosis was seen.  Certainly, her cardiac examination is completely normal.  I reviewed her labs.  She is mildly anemic with a hemoglobin of 11.1.  Several years ago, hemoglobin was 12.7.  She thinks it may be due to menorrhagia.  Otherwise, TSH and electrolytes are normal.      This lady takes propranolol for migraine headaches.  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      This lady has benign PVCs.  They are normal, and as such, I do not think anything specific needs to be done about them.  The frequency may be exacerbated, as she is mildly  anemic at this time.  If her PVCs are truly bothersome, she can take an extra dose of propranolol.  I reassured her that she essentially has a structurally normal heart.  No further cardiac workup or followup is necessary at this time.         ROOPA OVALLES MD, [facility]             D: 2020   T: 2020   MT: anabella      Name:     SANDRA CARLSON   MRN:      [id number]        Account:      [id number]   :      1980           Service Date: 2020      Document: [id number]          Outpatient Encounter Medications as of 2020   Medication Sig Dispense Refill     magnesium 250 MG tablet Take 1 tablet by mouth daily 30 tablet      Multiple Vitamins-Minerals (MULTIVITAMIN ADULT PO) Take by mouth daily       propranolol (INDERAL) 10 MG tablet TAKE ONE TABLET BY MOUTH TWICE A  tablet 3     No facility-administered encounter medications on file as of 2020.        Again, thank you for allowing me to participate in the care of your patient.      Sincerely,    DR ROOPA OVALLES MD     [facility]

## 2020-12-04 NOTE — PROGRESS NOTES
HPI and Plan:   See dictation    No orders of the defined types were placed in this encounter.      Orders Placed This Encounter   Medications     Multiple Vitamins-Minerals (MULTIVITAMIN ADULT PO)     Sig: Take by mouth daily       Encounter Diagnosis   Name Primary?     Palpitations        CURRENT MEDICATIONS:  Current Outpatient Medications   Medication Sig Dispense Refill     magnesium 250 MG tablet Take 1 tablet by mouth daily 30 tablet      Multiple Vitamins-Minerals (MULTIVITAMIN ADULT PO) Take by mouth daily       propranolol (INDERAL) 10 MG tablet TAKE ONE TABLET BY MOUTH TWICE A  tablet 3       ALLERGIES   No Known Allergies    PAST MEDICAL HISTORY:  No past medical history on file.    PAST SURGICAL HISTORY:  Past Surgical History:   Procedure Laterality Date     PHOTOREFRACTIVE KERATECTOMY  2004       FAMILY HISTORY:  Family History   Problem Relation Age of Onset     Glaucoma Maternal Grandmother      Family History Negative Mother      Cerebrovascular Disease Father      Clotting Disorder Father         Prothrombing Factor 2 Mutation.  found after stroke, did not recommend family members be tested.     Macular Degeneration No family hx of        SOCIAL HISTORY:  Social History     Socioeconomic History     Marital status:      Spouse name: None     Number of children: None     Years of education: None     Highest education level: None   Occupational History     None   Social Needs     Financial resource strain: None     Food insecurity     Worry: None     Inability: None     Transportation needs     Medical: None     Non-medical: None   Tobacco Use     Smoking status: Never Smoker     Smokeless tobacco: Never Used   Substance and Sexual Activity     Alcohol use: Yes     Alcohol/week: 1.0 standard drinks     Types: 1 Glasses of wine per week     Frequency: 2-4 times a month     Drug use: No     Sexual activity: Yes     Partners: Male     Birth control/protection: Condom   Lifestyle      "Physical activity     Days per week: None     Minutes per session: None     Stress: None   Relationships     Social connections     Talks on phone: None     Gets together: None     Attends Druze service: None     Active member of club or organization: None     Attends meetings of clubs or organizations: None     Relationship status: None     Intimate partner violence     Fear of current or ex partner: None     Emotionally abused: None     Physically abused: None     Forced sexual activity: None   Other Topics Concern     None   Social History Narrative     None       Review of Systems:  Skin:  Negative     Eyes:  Negative    ENT:  Negative    Respiratory:  Negative    Cardiovascular:    Positive for;palpitations  Gastroenterology: Negative    Genitourinary:  Negative    Musculoskeletal:  Negative    Neurologic:  Positive for headaches  Psychiatric:  Negative    Heme/Lymph/Imm:  Negative    Endocrine:  Negative      Physical Exam:  Vitals: /76 (BP Location: Right arm, Patient Position: Chair, Cuff Size: Adult Regular)   Pulse 62   Ht 1.676 m (5' 6\")   Wt 68.6 kg (151 lb 4.8 oz)   BMI 24.42 kg/m      Constitutional:  cooperative, alert and oriented, well developed, well nourished, in no acute distress        Skin:  warm and dry to the touch, no apparent skin lesions or masses noted          Head:  normocephalic, no masses or lesions        Eyes:  pupils equal and round, conjunctivae and lids unremarkable, sclera white, no xanthalasma, EOMS intact, no nystagmus        Lymph:No Cervical lymphadenopathy present     ENT:           Neck:  carotid pulses are full and equal bilaterally, JVP normal, no carotid bruit        Respiratory:  normal breath sounds, clear to auscultation, normal A-P diameter, normal symmetry, normal respiratory excursion, no use of accessory muscles         Cardiac: regular rhythm, normal S1/S2, no S3 or S4, apical impulse not displaced, no murmurs, gallops or rubs                pulses " full and equal, no bruits auscultated                                        GI:  abdomen soft, non-tender, BS normoactive, no mass, no HSM, no bruits        Extremities and Muscular Skeletal:  no deformities, clubbing, cyanosis, erythema observed              Neurological:  no gross motor deficits        Psych:  Alert and Oriented x 3        Recent Lab Results:  LIPID RESULTS:  Lab Results   Component Value Date    CHOL 162 12/18/2017    HDL 68 12/18/2017    LDL 75 12/18/2017    TRIG 96 12/18/2017       LIVER ENZYME RESULTS:  Lab Results   Component Value Date    AST 27 11/04/2020    ALT 49 11/04/2020       CBC RESULTS:  Lab Results   Component Value Date    WBC 6.4 11/04/2020    RBC 3.76 (L) 11/04/2020    HGB 11.1 (L) 11/04/2020    HCT 34.7 (L) 11/04/2020    MCV 92 11/04/2020    MCH 29.5 11/04/2020    MCHC 32.0 11/04/2020    RDW 12.0 11/04/2020     11/04/2020       BMP RESULTS:  Lab Results   Component Value Date     11/04/2020    POTASSIUM 4.0 11/04/2020    CHLORIDE 106 11/04/2020    CO2 28 11/04/2020    ANIONGAP 4 11/04/2020    GLC 93 11/04/2020    BUN 19 11/04/2020    CR 0.87 11/04/2020    GFRESTIMATED 83 11/04/2020    GFRESTBLACK >90 11/04/2020    EFREN 9.6 11/04/2020        A1C RESULTS:  No results found for: A1C    INR RESULTS:  No results found for: INR        CC  Alethea Edward APRN CNP  [id number] [address]

## 2020-12-04 NOTE — PROGRESS NOTES
Service Date: 12/04/2020      HISTORY OF PRESENT ILLNESS:  It is a pleasure for me to see this very delightful, 40-year-old lady for assessment of palpitations.  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  She had a cardiac evaluation and was told that that was normal.  Over the past few months, she has been experiencing palpitations.  She describes it as a skipped beat followed by a more forceful heartbeat.  This lasts for a split second.  She notices it more when she is sitting down and also at night.  Activity diminishes these symptoms.  She has no exertional chest discomfort or shortness of breath.  No history of smoking, drug or alcohol abuse.  No history of thyroid disease.  Symptoms have been increasing a little bit more recently.  She had a Holter monitor, which showed about 300 PVCs, which were well within the normal range.  It constituted less than 1% of all heartbeats.  A Holter shows essentially a structurally normal heart.  There was mention of a possible bicuspid aortic valve, though no significant regurgitation or stenosis was seen.  Certainly, her cardiac examination is completely normal.  I reviewed her labs.  She is mildly anemic with a hemoglobin of 11.1.  Several years ago, hemoglobin was 12.7.  She thinks it may be due to menorrhagia.  Otherwise, TSH and electrolytes are normal.      This lady takes propranolol for migraine headaches.  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      This lady has benign PVCs.  They are normal, and as such, I do not think anything specific needs to be done about them.  The frequency may be exacerbated, as she is mildly anemic at this time.  If her PVCs are truly bothersome, she can take an extra dose of propranolol.  I reassured her that she essentially has a structurally normal heart.  No further cardiac workup or followup is necessary at this  time.         ROOPA OVALLES MD, [facility]             D: 2020   T: 2020   MT: anabella      Name:     SANDRA CARLSON   MRN:      [id number]        Account:      [id number]   :      1980           Service Date: 2020      Document: [id number]

## 2020-12-04 NOTE — LETTER
12/4/2020    Alethea Edward, APRN CNP  [address] [address] [facility] [address]    RE: Rafa Herring       Dear Colleague,    I had the pleasure of seeing Rafa Herring in the [facility].    HPI and Plan:   See dictation    No orders of the defined types were placed in this encounter.      Orders Placed This Encounter   Medications     Multiple Vitamins-Minerals (MULTIVITAMIN ADULT PO)     Sig: Take by mouth daily       Encounter Diagnosis   Name Primary?     Palpitations        CURRENT MEDICATIONS:  Current Outpatient Medications   Medication Sig Dispense Refill     magnesium 250 MG tablet Take 1 tablet by mouth daily 30 tablet      Multiple Vitamins-Minerals (MULTIVITAMIN ADULT PO) Take by mouth daily       propranolol (INDERAL) 10 MG tablet TAKE ONE TABLET BY MOUTH TWICE A  tablet 3       ALLERGIES   No Known Allergies    PAST MEDICAL HISTORY:  No past medical history on file.    PAST SURGICAL HISTORY:  Past Surgical History:   Procedure Laterality Date     PHOTOREFRACTIVE KERATECTOMY  2004       FAMILY HISTORY:  Family History   Problem Relation Age of Onset     Glaucoma Maternal Grandmother      Family History Negative Mother      Cerebrovascular Disease Father      Clotting Disorder Father         Prothrombing Factor 2 Mutation.  found after stroke, did not recommend family members be tested.     Macular Degeneration No family hx of        SOCIAL HISTORY:  Social History     Socioeconomic History     Marital status:      Spouse name: None     Number of children: None     Years of education: None     Highest education level: None   Occupational History     None   Social Needs     Financial resource strain: None     Food insecurity     Worry: None     Inability: None     Transportation needs     Medical: None     Non-medical: None   Tobacco Use     Smoking status: Never Smoker     Smokeless tobacco: Never Used   Substance and Sexual Activity     Alcohol use: Yes  "    Alcohol/week: 1.0 standard drinks     Types: 1 Glasses of wine per week     Frequency: 2-4 times a month     Drug use: No     Sexual activity: Yes     Partners: Male     Birth control/protection: Condom   Lifestyle     Physical activity     Days per week: None     Minutes per session: None     Stress: None   Relationships     Social connections     Talks on phone: None     Gets together: None     Attends Druze service: None     Active member of club or organization: None     Attends meetings of clubs or organizations: None     Relationship status: None     Intimate partner violence     Fear of current or ex partner: None     Emotionally abused: None     Physically abused: None     Forced sexual activity: None   Other Topics Concern     None   Social History Narrative     None       Review of Systems:  Skin:  Negative     Eyes:  Negative    ENT:  Negative    Respiratory:  Negative    Cardiovascular:    Positive for;palpitations  Gastroenterology: Negative    Genitourinary:  Negative    Musculoskeletal:  Negative    Neurologic:  Positive for headaches  Psychiatric:  Negative    Heme/Lymph/Imm:  Negative    Endocrine:  Negative      Physical Exam:  Vitals: /76 (BP Location: Right arm, Patient Position: Chair, Cuff Size: Adult Regular)   Pulse 62   Ht 1.676 m (5' 6\")   Wt 68.6 kg (151 lb 4.8 oz)   BMI 24.42 kg/m      Constitutional:  cooperative, alert and oriented, well developed, well nourished, in no acute distress        Skin:  warm and dry to the touch, no apparent skin lesions or masses noted          Head:  normocephalic, no masses or lesions        Eyes:  pupils equal and round, conjunctivae and lids unremarkable, sclera white, no xanthalasma, EOMS intact, no nystagmus        Lymph:No Cervical lymphadenopathy present     ENT:           Neck:  carotid pulses are full and equal bilaterally, JVP normal, no carotid bruit        Respiratory:  normal breath sounds, clear to auscultation, normal A-P " diameter, normal symmetry, normal respiratory excursion, no use of accessory muscles         Cardiac: regular rhythm, normal S1/S2, no S3 or S4, apical impulse not displaced, no murmurs, gallops or rubs                pulses full and equal, no bruits auscultated                                        GI:  abdomen soft, non-tender, BS normoactive, no mass, no HSM, no bruits        Extremities and Muscular Skeletal:  no deformities, clubbing, cyanosis, erythema observed              Neurological:  no gross motor deficits        Psych:  Alert and Oriented x 3        Recent Lab Results:  LIPID RESULTS:  Lab Results   Component Value Date    CHOL 162 12/18/2017    HDL 68 12/18/2017    LDL 75 12/18/2017    TRIG 96 12/18/2017       LIVER ENZYME RESULTS:  Lab Results   Component Value Date    AST 27 11/04/2020    ALT 49 11/04/2020       CBC RESULTS:  Lab Results   Component Value Date    WBC 6.4 11/04/2020    RBC 3.76 (L) 11/04/2020    HGB 11.1 (L) 11/04/2020    HCT 34.7 (L) 11/04/2020    MCV 92 11/04/2020    MCH 29.5 11/04/2020    MCHC 32.0 11/04/2020    RDW 12.0 11/04/2020     11/04/2020       BMP RESULTS:  Lab Results   Component Value Date     11/04/2020    POTASSIUM 4.0 11/04/2020    CHLORIDE 106 11/04/2020    CO2 28 11/04/2020    ANIONGAP 4 11/04/2020    GLC 93 11/04/2020    BUN 19 11/04/2020    CR 0.87 11/04/2020    GFRESTIMATED 83 11/04/2020    GFRESTBLACK >90 11/04/2020    EFREN 9.6 11/04/2020        A1C RESULTS:  No results found for: A1C    INR RESULTS:  No results found for: INR        CC  FINN Campos Ra CNP  [address] [place] [address]                    Thank you for allowing me to participate in the care of your patient.      Sincerely,     DR ROOPA OVALLES MD     [facility]    cc:   FINN Campos Ra CNP  [id number] [place]  [place] [address]

## 2020-12-10 ENCOUNTER — HOSPITAL ENCOUNTER (OUTPATIENT)
Facility: CLINIC | Age: 40
End: 2020-12-10
Attending: NURSE PRACTITIONER

## 2021-01-07 DIAGNOSIS — D64.9 ANEMIA, UNSPECIFIED TYPE: ICD-10-CM

## 2021-01-07 LAB
ERYTHROCYTE [DISTWIDTH] IN BLOOD BY AUTOMATED COUNT: 11.8 % (ref 10–15)
HCT VFR BLD AUTO: 37.9 % (ref 35–47)
HGB BLD-MCNC: 12.1 G/DL (ref 11.7–15.7)
MCH RBC QN AUTO: 29.3 PG (ref 26.5–33)
MCHC RBC AUTO-ENTMCNC: 31.9 G/DL (ref 31.5–36.5)
MCV RBC AUTO: 92 FL (ref 78–100)
PLATELET # BLD AUTO: 227 10E9/L (ref 150–450)
RBC # BLD AUTO: 4.13 10E12/L (ref 3.8–5.2)
WBC # BLD AUTO: 8.1 10E9/L (ref 4–11)

## 2021-01-07 PROCEDURE — 36415 COLL VENOUS BLD VENIPUNCTURE: CPT | Performed by: NURSE PRACTITIONER

## 2021-01-07 PROCEDURE — 85027 COMPLETE CBC AUTOMATED: CPT | Performed by: NURSE PRACTITIONER

## 2021-04-17 ENCOUNTER — HEALTH MAINTENANCE LETTER (OUTPATIENT)
Age: 41
End: 2021-04-17

## 2021-10-02 ENCOUNTER — HEALTH MAINTENANCE LETTER (OUTPATIENT)
Age: 41
End: 2021-10-02

## 2022-01-19 ENCOUNTER — HOSPITAL ENCOUNTER (OUTPATIENT)
Dept: MAMMOGRAPHY | Facility: CLINIC | Age: 42
Discharge: HOME | End: 2022-01-19
Attending: NURSE PRACTITIONER | Admitting: NURSE PRACTITIONER
Payer: OTHER GOVERNMENT

## 2022-01-19 DIAGNOSIS — Z12.31 VISIT FOR SCREENING MAMMOGRAM: ICD-10-CM

## 2022-01-19 PROCEDURE — 77067 SCR MAMMO BI INCL CAD: CPT

## 2022-05-14 ENCOUNTER — HEALTH MAINTENANCE LETTER (OUTPATIENT)
Age: 42
End: 2022-05-14

## 2022-07-22 ASSESSMENT — ENCOUNTER SYMPTOMS
SORE THROAT: 0
PARESTHESIAS: 0
NAUSEA: 0
NERVOUS/ANXIOUS: 0
CHILLS: 0
HEADACHES: 0
ARTHRALGIAS: 0
BREAST MASS: 0
DYSURIA: 0
JOINT SWELLING: 0
DIARRHEA: 0
WEAKNESS: 0
FEVER: 0
HEMATURIA: 0
HEARTBURN: 0
CONSTIPATION: 0
ABDOMINAL PAIN: 0
COUGH: 0
FREQUENCY: 0
PALPITATIONS: 0
SHORTNESS OF BREATH: 0
DIZZINESS: 0
MYALGIAS: 0
EYE PAIN: 0
HEMATOCHEZIA: 0

## 2022-07-28 ENCOUNTER — OFFICE VISIT (OUTPATIENT)
Dept: FAMILY MEDICINE | Facility: CLINIC | Age: 42
End: 2022-07-28
Payer: OTHER GOVERNMENT

## 2022-07-28 VITALS
OXYGEN SATURATION: 100 % | SYSTOLIC BLOOD PRESSURE: 102 MMHG | TEMPERATURE: 98.2 F | HEART RATE: 77 BPM | BODY MASS INDEX: 22.82 KG/M2 | HEIGHT: 66 IN | DIASTOLIC BLOOD PRESSURE: 64 MMHG | WEIGHT: 142 LBS | RESPIRATION RATE: 16 BRPM

## 2022-07-28 DIAGNOSIS — Z00.00 ROUTINE GENERAL MEDICAL EXAMINATION AT A HEALTH CARE FACILITY: ICD-10-CM

## 2022-07-28 DIAGNOSIS — Z12.4 CERVICAL CANCER SCREENING: ICD-10-CM

## 2022-07-28 PROCEDURE — 99396 PREV VISIT EST AGE 40-64: CPT | Performed by: NURSE PRACTITIONER

## 2022-07-28 PROCEDURE — G0145 SCR C/V CYTO,THINLAYER,RESCR: HCPCS | Performed by: NURSE PRACTITIONER

## 2022-07-28 PROCEDURE — 87624 HPV HI-RISK TYP POOLED RSLT: CPT | Performed by: NURSE PRACTITIONER

## 2022-07-28 ASSESSMENT — ENCOUNTER SYMPTOMS
PALPITATIONS: 0
HEMATOCHEZIA: 0
FREQUENCY: 0
SHORTNESS OF BREATH: 0
SORE THROAT: 0
ARTHRALGIAS: 0
HEADACHES: 0
MYALGIAS: 0
FEVER: 0
JOINT SWELLING: 0
HEARTBURN: 0
BREAST MASS: 0
PARESTHESIAS: 0
HEMATURIA: 0
CONSTIPATION: 0
CHILLS: 0
NERVOUS/ANXIOUS: 0
ABDOMINAL PAIN: 0
COUGH: 0
NAUSEA: 0
WEAKNESS: 0
DYSURIA: 0
DIARRHEA: 0
DIZZINESS: 0
EYE PAIN: 0

## 2022-07-28 ASSESSMENT — PAIN SCALES - GENERAL: PAINLEVEL: NO PAIN (0)

## 2022-07-28 NOTE — PROGRESS NOTES
SUBJECTIVE:   CC: Rafa Herring is an 41 year old woman who presents for preventive health visit.       Patient has been advised of split billing requirements and indicates understanding: Yes  Healthy Habits:     Getting at least 3 servings of Calcium per day:  Yes    Bi-annual eye exam:  Yes    Dental care twice a year:  Yes    Sleep apnea or symptoms of sleep apnea:  None    Diet:  Regular (no restrictions)    Frequency of exercise:  4-5 days/week    Duration of exercise:  30-45 minutes    Taking medications regularly:  Not Applicable    Medication side effects:  Not applicable    PHQ-2 Total Score: 0    Additional concerns today:  No          Today's PHQ-2 Score:   PHQ-2 ( 1999 Pfizer) 7/22/2022   Q1: Little interest or pleasure in doing things 0   Q2: Feeling down, depressed or hopeless 0   PHQ-2 Score 0   PHQ-2 Total Score (12-17 Years)- Positive if 3 or more points; Administer PHQ-A if positive -   Q1: Little interest or pleasure in doing things Not at all   Q2: Feeling down, depressed or hopeless Not at all   PHQ-2 Score 0       Abuse: Current or Past (Physical, Sexual or Emotional) - No  Do you feel safe in your environment? Yes    Have you ever done Advance Care Planning? (For example, a Health Directive, POLST, or a discussion with a medical provider or your loved ones about your wishes): No, advance care planning information given to patient to review.  Patient declined advance care planning discussion at this time.    Social History     Tobacco Use     Smoking status: Never Smoker     Smokeless tobacco: Never Used   Substance Use Topics     Alcohol use: Yes     Alcohol/week: 1.0 standard drink     Types: 1 Glasses of wine per week     If you drink alcohol do you typically have >3 drinks per day or >7 drinks per week? No    Alcohol Use 7/28/2022   Prescreen: >3 drinks/day or >7 drinks/week? -   Prescreen: >3 drinks/day or >7 drinks/week? No       Reviewed orders with patient.  Reviewed health  maintenance and updated orders accordingly - Yes  Patient Active Problem List   Diagnosis     Migraine     Past Surgical History:   Procedure Laterality Date     PHOTOREFRACTIVE KERATECTOMY  2004       Social History     Tobacco Use     Smoking status: Never Smoker     Smokeless tobacco: Never Used   Substance Use Topics     Alcohol use: Yes     Alcohol/week: 1.0 standard drink     Types: 1 Glasses of wine per week     Family History   Problem Relation Age of Onset     Glaucoma Maternal Grandmother      Family History Negative Mother      Cerebrovascular Disease Father      Clotting Disorder Father         Prothrombing Factor 2 Mutation.  found after stroke, did not recommend family members be tested.     Macular Degeneration No family hx of            Breast Cancer Screening:    Breast CA Risk Assessment (FHS-7) 7/22/2022   Do you have a family history of breast, colon, or ovarian cancer? No / Unknown         Mammogram Screening - Offered annual screening and updated Health Maintenance for [place] plan based on risk factor consideration    Pertinent mammograms are reviewed under the imaging tab.    History of abnormal Pap smear: NO - age 30-65 PAP every 5 years with negative HPV co-testing recommended  PAP / HPV Latest Ref Rng & Units 7/19/2017   PAP (Historical) - NIL   HPV16 NEG Negative   HPV18 NEG Negative   HRHPV NEG Negative     Reviewed and updated as needed this visit by clinical staff    Allergies  Meds                Reviewed and updated as needed this visit by Provider                       Review of Systems   Constitutional: Negative for chills and fever.   HENT: Negative for congestion, ear pain, hearing loss and sore throat.    Eyes: Negative for pain and visual disturbance.   Respiratory: Negative for cough and shortness of breath.    Cardiovascular: Negative for chest pain, palpitations and peripheral edema.   Gastrointestinal: Negative for abdominal pain, constipation, diarrhea, heartburn,  "hematochezia and nausea.   Breasts:  Negative for tenderness, breast mass and discharge.   Genitourinary: Negative for dysuria, frequency, genital sores, hematuria, pelvic pain, urgency, vaginal bleeding and vaginal discharge.   Musculoskeletal: Negative for arthralgias, joint swelling and myalgias.   Skin: Negative for rash.   Neurological: Negative for dizziness, weakness, headaches and paresthesias.   Psychiatric/Behavioral: Negative for mood changes. The patient is not nervous/anxious.           OBJECTIVE:   /64 (BP Location: Right arm, Patient Position: Sitting, Cuff Size: Adult Regular)   Pulse 77   Temp 98.2  F (36.8  C) (Oral)   Resp 16   Ht 1.676 m (5' 6\")   Wt 64.4 kg (142 lb)   LMP 07/10/2022 (Approximate)   SpO2 100%   Breastfeeding No   BMI 22.92 kg/m    Physical Exam  GENERAL: healthy, alert and no distress  EYES: Eyes grossly normal to inspection  HENT: ear canals and TM's normal, nose and mouth without ulcers or lesions  NECK: no adenopathy, no asymmetry, masses, or scars and thyroid normal to palpation  RESP: lungs clear to auscultation - no rales, rhonchi or wheezes  CV: regular rate and rhythm, normal S1 S2, no S3 or S4, no murmur, click or rub, no peripheral edema and peripheral pulses strong  ABDOMEN: soft, nontender, no hepatosplenomegaly, no masses and bowel sounds normal   (female): normal female external genitalia, normal urethral meatus, vaginal mucosa pink, moist, well rugated, and normal cervix/adnexa/uterus without masses or discharge  NEURO: Normal strength and tone, mentation intact and speech normal  PSYCH: mentation appears normal, affect normal/bright    Diagnostic Test Results:  Labs reviewed in Epic    ASSESSMENT/PLAN:   (Z00.00) Routine general medical examination at a health care facility  Comment:   Plan: Comprehensive metabolic panel (BMP + Alb, Alk         Phos, ALT, AST, Total. Bili, TP), Lipid panel         reflex to direct LDL Fasting            (Z12.4) " "Cervical cancer screening  Comment:   Plan: Pap Screen with HPV - recommended age 30 - 65         years                  COUNSELING:  Reviewed preventive health counseling, as reflected in patient instructions    Estimated body mass index is 22.92 kg/m  as calculated from the following:    Height as of this encounter: 1.676 m (5' 6\").    Weight as of this encounter: 64.4 kg (142 lb).        She reports that she has never smoked. She has never used smokeless tobacco.      Counseling Resources:  ATP IV Guidelines  Pooled Cohorts Equation Calculator  Breast Cancer Risk Calculator  BRCA-Related Cancer Risk Assessment: FHS-7 Tool  FRAX Risk Assessment  ICSI Preventive Guidelines  Dietary Guidelines for Americans, 2010  USDA's MyPlate  ASA Prophylaxis  Lung CA Screening    FINN Campos Ra CNP  [facility]  "

## 2022-08-01 LAB
BKR LAB AP GYN ADEQUACY: NORMAL
BKR LAB AP GYN INTERPRETATION: NORMAL
BKR LAB AP HPV REFLEX: NORMAL
BKR LAB AP PREVIOUS ABNORMAL: NORMAL
PATH REPORT.COMMENTS IMP SPEC: NORMAL
PATH REPORT.COMMENTS IMP SPEC: NORMAL
PATH REPORT.RELEVANT HX SPEC: NORMAL

## 2022-08-03 LAB
HUMAN PAPILLOMA VIRUS 16 DNA: NEGATIVE
HUMAN PAPILLOMA VIRUS 18 DNA: NEGATIVE
HUMAN PAPILLOMA VIRUS FINAL DIAGNOSIS: NORMAL
HUMAN PAPILLOMA VIRUS OTHER HR: NEGATIVE

## 2022-09-02 ENCOUNTER — LAB (OUTPATIENT)
Dept: LAB | Facility: CLINIC | Age: 42
End: 2022-09-02
Payer: OTHER GOVERNMENT

## 2022-09-02 DIAGNOSIS — Z00.00 ROUTINE GENERAL MEDICAL EXAMINATION AT A HEALTH CARE FACILITY: ICD-10-CM

## 2022-09-02 LAB
ALBUMIN SERPL-MCNC: 3.9 G/DL (ref 3.4–5)
ALP SERPL-CCNC: 61 U/L (ref 40–150)
ALT SERPL W P-5'-P-CCNC: 27 U/L (ref 0–50)
ANION GAP SERPL CALCULATED.3IONS-SCNC: 5 MMOL/L (ref 3–14)
AST SERPL W P-5'-P-CCNC: 13 U/L (ref 0–45)
BILIRUB SERPL-MCNC: 0.8 MG/DL (ref 0.2–1.3)
BUN SERPL-MCNC: 16 MG/DL (ref 7–30)
CALCIUM SERPL-MCNC: 9.2 MG/DL (ref 8.5–10.1)
CHLORIDE BLD-SCNC: 109 MMOL/L (ref 94–109)
CHOLEST SERPL-MCNC: 140 MG/DL
CO2 SERPL-SCNC: 25 MMOL/L (ref 20–32)
CREAT SERPL-MCNC: 0.93 MG/DL (ref 0.52–1.04)
FASTING STATUS PATIENT QL REPORTED: YES
GFR SERPL CREATININE-BSD FRML MDRD: 78 ML/MIN/1.73M2
GLUCOSE BLD-MCNC: 98 MG/DL (ref 70–99)
HDLC SERPL-MCNC: 69 MG/DL
LDLC SERPL CALC-MCNC: 56 MG/DL
NONHDLC SERPL-MCNC: 71 MG/DL
POTASSIUM BLD-SCNC: 4.1 MMOL/L (ref 3.4–5.3)
PROT SERPL-MCNC: 7.3 G/DL (ref 6.8–8.8)
SODIUM SERPL-SCNC: 139 MMOL/L (ref 133–144)
TRIGL SERPL-MCNC: 74 MG/DL

## 2022-09-02 PROCEDURE — 80061 LIPID PANEL: CPT

## 2022-09-02 PROCEDURE — 36415 COLL VENOUS BLD VENIPUNCTURE: CPT

## 2022-09-02 PROCEDURE — 80053 COMPREHEN METABOLIC PANEL: CPT

## 2022-09-03 ENCOUNTER — HEALTH MAINTENANCE LETTER (OUTPATIENT)
Age: 42
End: 2022-09-03

## 2022-09-20 NOTE — PROGRESS NOTES
ASSESSMENT & PLAN    1. Chronic pain of right knee    2. Low grade sprain of MPFL of right knee, initial encounter      Rafa Herring is a 42 year old female presenting for evaluation of intermittent right anteromedial knee pain after an injury in January 2022.  History, exam and XR imaging findings were reviewed today, consistent with low grade sprain of the MPFL. Differential includes low grade MCL sprain. No ligamentous laxity or pain on exam today, although symptoms have intermittently been provoked by activity. Suspect this is due to kinetic chain dysfunction with glute/core weakness and dynamic knee valgus. Remainder of ligamentous structures appears stable with no pain on palpation or with stressing. Reviewed treatment options inclusive of pain control, activity modification, bracing and formal physical therapy. Also reviewed timing of advance imaging (ie MRI).     At this time, will proceed with the following plan:  - Referral placed for formal physical therapy - exercises to include quadriceps/hamstring/calf stretching/strengthening with range of motion exercises, manual therapy, hip mobilizations, and gait/balance training with use of modalities as needed with home exercise prescription.  - In the meantime, activity as tolerated based on pain. Avoid painful activities including repetitive deep knee flexion (squats, lunges, stairs, etc).  - Heat, ice, compression as needed for symptoms.  - Topical Voltaren (diclofenac) gel. This is an over-the-counter topical NSAID medication like Ibuprofen that can be applied to the knee up to 4 times per day for 2 weeks, then reduce to as-needed.    Please schedule a follow up appointment to see me in 6-8 weeks, or sooner as needed for persistence or worsening of pain. You may call our direct clinic number ([phone]) at any time with questions or concerns.    Agnes Acharya MD, [facility]      -----    SUBJECTIVE  Rafa Herring is a/an 42 year old female who is seen as a self referral for evaluation of right knee pain. The patient is seen by themselves.    Onset: 9 month(s) ago. Patient describes injury as taking snow boot off, and felt something pull in her knee. She did not feel a pop or snap. She did not notice any swelling or bruising. She rested and iced for a couple of weeks which was helpful. Pain gradually improved, but never completely resolved. Pain has been on and off again since January 2022. She describes pain as dull currently.  Location of Pain: right medial knee  Rating of Pain at worst: 5/10  Rating of Pain Currently: 0/10  Worsened by: Knee lunges, stairs  Better with: rest, ice, knee sleeve  Treatments tried: RICE  Associated symptoms: Stiffness. No catching, locking, giving out. no distal numbness or tingling; denies swelling or warmth  Orthopedic history: NO  Relevant surgical history: NO  Social history: works at [employer].    No past medical history on file.  Social History     Socioeconomic History     Marital status:    Tobacco Use     Smoking status: Never Smoker     Smokeless tobacco: Never Used   Substance and Sexual Activity     Alcohol use: Yes     Alcohol/week: 1.0 standard drink     Types: 1 Glasses of wine per week     Drug use: No     Sexual activity: Yes     Partners: Male     Birth control/protection: Condom         Patient's past medical, surgical, social, and family histories were reviewed today and no changes are noted.    REVIEW OF SYSTEMS:  10 point ROS is negative other than symptoms noted above in HPI, Past Medical History or as stated below  Constitutional: NEGATIVE for fever, chills, change in weight  Skin: NEGATIVE for worrisome rashes, moles or lesions  GI/: NEGATIVE for bowel or bladder changes  Neuro: NEGATIVE for weakness, dizziness or paresthesias    OBJECTIVE:  /62   Wt 63.5 kg (140 lb)   BMI 22.60 kg/m     General:  healthy, alert and in no distress  HEENT: no scleral icterus or conjunctival erythema  Skin: no suspicious lesions or rash. No jaundice.  CV: no pedal edema  Resp: normal respiratory effort without conversational dyspnea   Psych: normal mood and affect  Gait: normal steady gait with appropriate coordination and balance  Neuro: Normal light sensory exam of lower extremity  MSK:  RIGHT KNEE  Inspection:    Normal alignment  Palpation:    Bony and ligamentous landmarks are nontender.    No effusion is present    Patellofemoral crepitus is Present  Range of Motion:     00 extension to 1350 flexion  Strength:    Quadriceps 5/5, hamstrings 5/5    Extensor mechanism intact    Dynamic knee valgus (+ sensation of anteromedial knee pulling) with single leg squat  Special Tests:    Negative: Patellar grind, patellar apprehension, MCL/valgus stress (0 & 30 deg), LCL/varus stress (0 & 30 deg), Lachman's, anterior drawer, posterior drawer, posterior sag, Marisabel's, Thessaly's, Bounce    Independent visualization of the below image:  Recent Results (from the past 24 hour(s))   XR Knee Standing AP Bilat [place] Bilat Lat Right    Narrative    XR KNEE STANDING AP [place] BILAT LAT RIGHT 9/22/2022 9:53 AM     HISTORY: Chronic pain of right knee; Chronic pain of right knee    COMPARISON: None.      Impression    IMPRESSION: Normal joint spacing. Normal patellar alignment. No knee  joint effusion. No fractures are evident. The left knee is  unremarkable.    NELLY OLEA MD         SYSTEM ID:  [id number]       Agnes Acharya MD, [facility]

## 2022-09-22 ENCOUNTER — ANCILLARY PROCEDURE (OUTPATIENT)
Dept: GENERAL RADIOLOGY | Facility: CLINIC | Age: 42
End: 2022-09-22
Attending: STUDENT IN AN ORGANIZED HEALTH CARE EDUCATION/TRAINING PROGRAM
Payer: OTHER GOVERNMENT

## 2022-09-22 ENCOUNTER — OFFICE VISIT (OUTPATIENT)
Dept: ORTHOPEDICS | Facility: CLINIC | Age: 42
End: 2022-09-22

## 2022-09-22 VITALS — DIASTOLIC BLOOD PRESSURE: 62 MMHG | WEIGHT: 140 LBS | BODY MASS INDEX: 22.6 KG/M2 | SYSTOLIC BLOOD PRESSURE: 100 MMHG

## 2022-09-22 DIAGNOSIS — G89.29 CHRONIC PAIN OF RIGHT KNEE: ICD-10-CM

## 2022-09-22 DIAGNOSIS — S83.411A SPRAIN OF MEDIAL COLLATERAL LIGAMENT OF RIGHT KNEE, INITIAL ENCOUNTER: ICD-10-CM

## 2022-09-22 DIAGNOSIS — M25.561 CHRONIC PAIN OF RIGHT KNEE: ICD-10-CM

## 2022-09-22 DIAGNOSIS — M25.561 CHRONIC PAIN OF RIGHT KNEE: Primary | ICD-10-CM

## 2022-09-22 DIAGNOSIS — G89.29 CHRONIC PAIN OF RIGHT KNEE: Primary | ICD-10-CM

## 2022-09-22 PROCEDURE — 73562 X-RAY EXAM OF KNEE 3: CPT | Mod: TC | Performed by: RADIOLOGY

## 2022-09-22 PROCEDURE — 99204 OFFICE O/P NEW MOD 45 MIN: CPT | Performed by: STUDENT IN AN ORGANIZED HEALTH CARE EDUCATION/TRAINING PROGRAM

## 2022-09-22 PROCEDURE — 73560 X-RAY EXAM OF KNEE 1 OR 2: CPT | Mod: TC | Performed by: RADIOLOGY

## 2022-09-22 NOTE — PATIENT INSTRUCTIONS
1. Chronic pain of right knee    2. Low grade sprain of MPFL of right knee, initial encounter      Rafa Herring is a 42 year old female presenting for evaluation of intermittent right anteromedial knee pain after an injury in January 2022.  History, exam and XR imaging findings were reviewed today, consistent with low grade sprain of the MPFL. Differential includes low grade MCL sprain as well. No ligamentous laxity or pain on exam today, although symptoms have intermittently been provoked by activity, resulting from kinetic chain dysfunction with glute/core weakness. Remainder of ligamentous structures appears stable with no pain on palpation or with stressing. Reviewed treatment options inclusive of pain control, activity modification, bracing and formal physical therapy. Also reviewed timing of advance imaging (ie MRI).     At this time, will proceed with the following plan:  - Referral placed for formal physical therapy - exercises to include quadriceps/hamstring/calf stretching/strengthening with range of motion exercises, manual therapy, hip mobilizations, and gait/balance training with use of modalities as needed with home exercise prescription.  - In the meantime, activity as tolerated based on pain. Avoid painful activities including repetitive deep knee flexion (squats, lunges, stairs, etc).  - Heat, ice, compression as needed for symptoms.  - Topical Voltaren (diclofenac) gel. This is an over-the-counter topical NSAID medication like Ibuprofen that can be applied to the knee up to 4 times per day for 2 weeks, then reduce to as-needed.    Please schedule a follow up appointment to see me in 6-8 weeks, or sooner as needed for persistence or worsening of pain. You may call our direct clinic number ([phone]) at any time with questions or concerns.    Agnes Acharya MD, [facility]

## 2022-10-06 ENCOUNTER — THERAPY VISIT (OUTPATIENT)
Dept: PHYSICAL THERAPY | Facility: CLINIC | Age: 42
End: 2022-10-06
Attending: STUDENT IN AN ORGANIZED HEALTH CARE EDUCATION/TRAINING PROGRAM
Payer: OTHER GOVERNMENT

## 2022-10-06 DIAGNOSIS — S83.411A SPRAIN OF MEDIAL COLLATERAL LIGAMENT OF RIGHT KNEE, INITIAL ENCOUNTER: ICD-10-CM

## 2022-10-06 DIAGNOSIS — G89.29 CHRONIC PAIN OF RIGHT KNEE: ICD-10-CM

## 2022-10-06 DIAGNOSIS — M25.561 CHRONIC PAIN OF RIGHT KNEE: ICD-10-CM

## 2022-10-06 PROCEDURE — 97110 THERAPEUTIC EXERCISES: CPT | Mod: GP | Performed by: PHYSICAL THERAPIST

## 2022-10-06 PROCEDURE — 97161 PT EVAL LOW COMPLEX 20 MIN: CPT | Mod: GP | Performed by: PHYSICAL THERAPIST

## 2022-10-06 NOTE — PROGRESS NOTES
Physical Therapy Initial Evaluation  Subjective:  The history is provided by the patient. No  was used.   Patient Health History  Rafa Herring being seen for Physical therapy right knee.     Problem began: 1/5/2022.   Problem occurred: Walking, removing snow boots   Pain is reported as 0/10 on pain scale.  General health as reported by patient is good.  Pertinent medical history includes: none.     Medical allergies: none.   Surgeries include:  Other. Other surgery history details: PRK eye laser.    Current medications:  None.    Current occupation is Homemaker.   Primary job tasks include:  Other.   Other job/home tasks details: [employer].                Therapist Generated HPI Evaluation  Problem details: Pt. complains of right knee pain that has been present for greater than 4 months.  No known trauma.  PT order date 9/22/22.    .         Type of problem:  Right knee.    This is a chronic condition.  Condition occurred with:  Insidious onset.    Patient reports pain:  Anterior, medial and sub patellar.  Pain is described as aching and is intermittent.  Pain is worse during the day.  Since onset symptoms are gradually improving.  Exacerbated by: removing other shoe with right foot.  and relieved by rest.  Special tests included:  X-ray.    Restrictions due to condition include:  Working in normal job without restrictions.                          Objective:  Standing Alignment:            Hip:  Normal  Knee:  Normal          Flexibility/Screens:   Positive screens:  Knee                                                     Hip Evaluation  HIP AROM:  AROM:    Left Hip:     Normal    Right Hip:   Normal                  Hip PROM:  Hip PROM:  Left Hip:    Normal  Right Hip:  Normal                          Hip Strength:    Flexion:   Left: 4+/5   Pain:  Right: 4+/5   Pain:                    Extension:  Left: 4+/5  -  Pain:Right: 3+/5    -  Pain:    Abduction:  Left: 4+/5     Pain:Right: 3+/5   -    Pain:                           Knee Evaluation:  ROM:  AROM: normal  PROM: normal            Strength:     Extension:  Left: 4+/5    Pain:-      Right: 4/5    Pain:+/-  Flexion:  Left: 5/5    Pain:-      Right: 5/5    Pain:-    Quad Set Left:  Good    Pain: -   Quad Set Right:  Good    Pain: -  Ligament Testing:  Normal                Special Tests:     Left knee negative for the following special tests:  Meninscal; Patellar Tracking-Abduction Medial and Patellar Tracking-Abduction Lateral    Right knee negative for the following special tests:  Meniscal; Patellar Tracking-Abduction Medial and Patellar Tracking-Abduction Lateral  Palpation:  Normal      Edema:  Normal            General     ROS    Assessment/Plan:    Patient is a 42 year old female with right side knee complaints.    Patient has the following significant findings with corresponding treatment plan.                Diagnosis 1:  R knee pain  Pain -  self management, education, directional preference exercise and home program  Decreased strength - therapeutic exercise and therapeutic activities  Impaired muscle performance - neuro re-education  Decreased function - therapeutic activities    Therapy Evaluation Codes:     1) Clinical presentation characteristics are:   Stable/Uncomplicated.  2) Decision-Making    Low complexity using standardized patient assessment instrument and/or measureable assessment of functional outcome.  Cumulative Therapy Evaluation is: Low complexity.    Previous and current functional limitations:  (See Goal Flow Sheet for this information)    Short term and Long term goals: (See Goal Flow Sheet for this information)     Communication ability:  Patient appears to be able to clearly communicate and understand verbal and written communication and follow directions correctly.  Treatment Explanation - The following has been discussed with the patient:   RX ordered/plan of care  Anticipated outcomes  Possible risks and side  effects  This patient would benefit from PT intervention to resume normal activities.   Rehab potential is good.    Frequency:  1 X week, once daily  Duration:  for 6 weeks  Discharge Plan:  Achieve all LTG.  Independent in home treatment program.  Reach maximal therapeutic benefit.    Please refer to the daily flowsheet for treatment today, total treatment time and time spent performing 1:1 timed codes.

## 2022-10-07 ASSESSMENT — ACTIVITIES OF DAILY LIVING (ADL)
AS_A_RESULT_OF_YOUR_KNEE_INJURY,_HOW_WOULD_YOU_RATE_YOUR_CURRENT_LEVEL_OF_DAILY_ACTIVITY?: NEARLY NORMAL
KNEE_ACTIVITY_OF_DAILY_LIVING_SCORE: 91.43
STAND: ACTIVITY IS NOT DIFFICULT
RAW_SCORE: 64
KNEE_ACTIVITY_OF_DAILY_LIVING_SUM: 64
SQUAT: ACTIVITY IS SOMEWHAT DIFFICULT
GIVING WAY, BUCKLING OR SHIFTING OF KNEE: I DO NOT HAVE THE SYMPTOM
STIFFNESS: I DO NOT HAVE THE SYMPTOM
WEAKNESS: I DO NOT HAVE THE SYMPTOM
PAIN: I HAVE THE SYMPTOM BUT IT DOES NOT AFFECT MY ACTIVITY
GO DOWN STAIRS: ACTIVITY IS NOT DIFFICULT
HOW_WOULD_YOU_RATE_THE_CURRENT_FUNCTION_OF_YOUR_KNEE_DURING_YOUR_USUAL_DAILY_ACTIVITIES_ON_A_SCALE_FROM_0_TO_100_WITH_100_BEING_YOUR_LEVEL_OF_KNEE_FUNCTION_PRIOR_TO_YOUR_INJURY_AND_0_BEING_THE_INABILITY_TO_PERFORM_ANY_OF_YOUR_USUAL_DAILY_ACTIVITIES?: 95
RISE FROM A CHAIR: ACTIVITY IS NOT DIFFICULT
HOW_WOULD_YOU_RATE_THE_OVERALL_FUNCTION_OF_YOUR_KNEE_DURING_YOUR_USUAL_DAILY_ACTIVITIES?: NEARLY NORMAL
KNEEL ON THE FRONT OF YOUR KNEE: ACTIVITY IS MINIMALLY DIFFICULT
LIMPING: I DO NOT HAVE THE SYMPTOM
GO UP STAIRS: ACTIVITY IS NOT DIFFICULT
SWELLING: I DO NOT HAVE THE SYMPTOM
WALK: ACTIVITY IS NOT DIFFICULT
SIT WITH YOUR KNEE BENT: ACTIVITY IS SOMEWHAT DIFFICULT

## 2022-10-21 ENCOUNTER — OFFICE VISIT (OUTPATIENT)
Dept: URGENT CARE | Facility: URGENT CARE | Age: 42
End: 2022-10-21
Payer: OTHER GOVERNMENT

## 2022-10-21 VITALS
OXYGEN SATURATION: 100 % | TEMPERATURE: 98.6 F | DIASTOLIC BLOOD PRESSURE: 78 MMHG | SYSTOLIC BLOOD PRESSURE: 118 MMHG | HEART RATE: 75 BPM

## 2022-10-21 DIAGNOSIS — J04.0 ACUTE LARYNGITIS: ICD-10-CM

## 2022-10-21 DIAGNOSIS — J02.9 SORE THROAT: Primary | ICD-10-CM

## 2022-10-21 LAB
DEPRECATED S PYO AG THROAT QL EIA: NEGATIVE
GROUP A STREP BY PCR: NOT DETECTED

## 2022-10-21 PROCEDURE — U0005 INFEC AGEN DETEC AMPLI PROBE: HCPCS | Performed by: FAMILY MEDICINE

## 2022-10-21 PROCEDURE — U0003 INFECTIOUS AGENT DETECTION BY NUCLEIC ACID (DNA OR RNA); SEVERE ACUTE RESPIRATORY SYNDROME CORONAVIRUS 2 (SARS-COV-2) (CORONAVIRUS DISEASE [COVID-19]), AMPLIFIED PROBE TECHNIQUE, MAKING USE OF HIGH THROUGHPUT TECHNOLOGIES AS DESCRIBED BY CMS-2020-01-R: HCPCS | Performed by: FAMILY MEDICINE

## 2022-10-21 PROCEDURE — 99213 OFFICE O/P EST LOW 20 MIN: CPT | Mod: CS | Performed by: FAMILY MEDICINE

## 2022-10-21 PROCEDURE — 87651 STREP A DNA AMP PROBE: CPT | Performed by: FAMILY MEDICINE

## 2022-10-21 RX ORDER — LIDOCAINE HYDROCHLORIDE 20 MG/ML
15 SOLUTION OROPHARYNGEAL
Qty: 100 ML | Refills: 3 | Status: SHIPPED | OUTPATIENT
Start: 2022-10-21 | End: 2024-04-24

## 2022-10-21 NOTE — PROGRESS NOTES
SUBJECTIVE:   Rafa Herring is a 42 year old female presenting with a chief complaint of worsening sore throat and hoarseness and a sensation of throat swelling up.  .  Onset of symptoms was four days ago.  Course of illness is worsening. .    Severity moderate pain.    Current and Associated symptoms: as listed above.  Patient can still swallow liquids and solids.    Treatment measures tried include Nyquil and Ibuprofen without much relief.  .  Predisposing factors include None.  No sick contacts.      Patient received one dose of the [employer] COVID-19 vaccine on May 18, 2021.  No boosters since then.      .    Past Medical History:    No major medical problems.     Current Outpatient Medications   Medication Sig Dispense Refill     magnesium 250 MG tablet Take 1 tablet by mouth daily 30 tablet      Social History     Tobacco Use     Smoking status: Never     Smokeless tobacco: Never   Substance Use Topics     Alcohol use: Yes     Alcohol/week: 1.0 standard drink     Types: 1 Glasses of wine per week       ROS:  CONSTITUTIONAL:negative for fevers.    ENT/MOUTH:  Positive for worsening sore throat and hoarsenss.    RESP: no cough/shortness of breath.      OBJECTIVE:  /78   Pulse 75   Temp 98.6  F (37  C)   SpO2 100%   GENERAL APPEARANCE: healthy, alert and no distress.  Patient's voice is hoarse.    HENT: Nose:  Mild erythema and edema at the turbinates.  The oropharynx has minimal erythema.  No prominent tonsils.  No exudates.  No obvious obstruction.  Patient is not drooling.    NECK: supple, nontender, no lymphadenopathy  RESP: lungs clear to auscultation - no rales, rhonchi or wheezes  CV: regular rates and rhythm, normal S1 S2, no murmur noted  SKIN: no cyanosis.      LAB:    Results for orders placed or performed in visit on 10/21/22   Streptococcus A Rapid Screen w/Reflex to PCR - Clinic Collect     Status: Normal    Specimen: Throat; Swab   Result Value Ref Range    Group A Strep antigen  Negative Negative         ASSESSMENT:  Laryngitis  Sore Throat    PLAN:  Pending labs:  COVID-19 PCR Test, Strep PCR Test.  Rx:  Viscous Lidocaine.      Drink plenty of ice-cold liquids.      Take Ibuprofen, Tylenol for the sore throat.      Follow up if not better in 4-5 days.      Rest your voice as much as possible over the next few days.      Follow up if unable to swallow liquids/solids, if you have worsening fevers, or if you experiencing worsening shortness of breath.       Deshawn Lerma MD

## 2022-10-21 NOTE — PATIENT INSTRUCTIONS
Drink plenty of ice-cold liquids.      Take Ibuprofen, Tylenol for the sore throat.      Follow up if not better in 4-5 days.      Rest your voice as much as possible over the next few days.      Follow up if unable to swallow liquids/solids, if you have worsening fevers, or if you experiencing worsening shortness of breath.

## 2022-10-22 LAB — SARS-COV-2 RNA RESP QL NAA+PROBE: NEGATIVE

## 2022-11-15 ENCOUNTER — LAB (OUTPATIENT)
Dept: LAB | Facility: CLINIC | Age: 42
End: 2022-11-15
Payer: OTHER GOVERNMENT

## 2022-11-15 ENCOUNTER — E-VISIT (OUTPATIENT)
Dept: FAMILY MEDICINE | Facility: CLINIC | Age: 42
End: 2022-11-15

## 2022-11-15 DIAGNOSIS — B37.31 CANDIDIASIS OF VAGINA: ICD-10-CM

## 2022-11-15 DIAGNOSIS — N89.8 VAGINAL DISCHARGE: ICD-10-CM

## 2022-11-15 DIAGNOSIS — N89.8 VAGINAL DISCHARGE: Primary | ICD-10-CM

## 2022-11-15 LAB
CLUE CELLS: ABNORMAL
TRICHOMONAS, WET PREP: ABNORMAL
WBC'S/HIGH POWER FIELD, WET PREP: ABNORMAL
YEAST, WET PREP: PRESENT

## 2022-11-15 PROCEDURE — 99421 OL DIG E/M SVC 5-10 MIN: CPT | Performed by: PHYSICIAN ASSISTANT

## 2022-11-15 PROCEDURE — 87210 SMEAR WET MOUNT SALINE/INK: CPT

## 2022-11-15 RX ORDER — FLUCONAZOLE 150 MG/1
150 TABLET ORAL ONCE
Qty: 1 TABLET | Refills: 0 | Status: SHIPPED | OUTPATIENT
Start: 2022-11-15 | End: 2022-11-15

## 2022-11-17 ENCOUNTER — TELEPHONE (OUTPATIENT)
Dept: FAMILY MEDICINE | Facility: CLINIC | Age: 42
End: 2022-11-17

## 2022-11-17 NOTE — LETTER
[facility]  [address] [facility] [facility] [address]  [phone]       December 1, 2022    Rafa Herring  [address] [place] [address]    Dear Rafa,    We care about your health and have reviewed your health plan and are making recommendations based on this review, to optimize your health.    You are in particular need of attention regarding:  -Immunizations     We are recommending that you:  -. It is flu season, please schedule a nurse only appointment to recieve your flu shot.        In addition, here is a list of due or overdue Health Maintenance reminders.    Health Maintenance Due   Topic Date Due     ANNUAL REVIEW OF HM ORDERS  Never done     HIV Screening  Never done     Hepatitis C Screening  Never done     Eye Exam  06/08/2012     COVID-19 Vaccine (2 - Booster for Lorna series) 07/13/2021     Flu Vaccine (1) 09/01/2022       To address the above recommendations, we encourage you to contact us at [phone], via [employer] or by contacting Central Scheduling toll free at [phone] 24 hours a day. They will assist you with finding the most convenient time and location.    Thank you for trusting [facility] and we appreciate the opportunity to serve you.  We look forward to supporting your healthcare needs in the future.    Healthy Regards,    Your [facility] Team

## 2022-11-17 NOTE — TELEPHONE ENCOUNTER
Patient Quality Outreach    Patient is due for the following:       Topic Date Due     COVID-19 Vaccine (2 - Booster for Lorna series) 07/13/2021     Flu Vaccine (1) 09/01/2022       Next Steps:   Schedule a nurse only visit for immunizations     Type of outreach:    Sent [employer] message.      Questions for provider review:    None     José Manuel Barbour MA

## 2022-12-01 NOTE — TELEPHONE ENCOUNTER
Patient Quality Outreach    Patient is due for the following:       Topic Date Due     COVID-19 Vaccine (2 - Booster for Lorna series) 07/13/2021     Flu Vaccine (1) 09/01/2022       Next Steps:   Schedule a nurse only visit for immunizations     Type of outreach:    Sent letter.    Next Steps:  Reach out within 90 days via Letter.    Max number of attempts reached: Yes. Will try again in 90 days if patient still on fail list.    Questions for provider review:    None     José Manuel Barbour MA

## 2023-04-26 ENCOUNTER — HOSPITAL ENCOUNTER (OUTPATIENT)
Dept: MAMMOGRAPHY | Facility: CLINIC | Age: 43
Discharge: HOME | End: 2023-04-26
Attending: NURSE PRACTITIONER | Admitting: NURSE PRACTITIONER
Payer: OTHER GOVERNMENT

## 2023-04-26 DIAGNOSIS — Z12.31 VISIT FOR SCREENING MAMMOGRAM: ICD-10-CM

## 2023-04-26 PROCEDURE — 77067 SCR MAMMO BI INCL CAD: CPT

## 2023-04-28 ENCOUNTER — HOSPITAL ENCOUNTER (OUTPATIENT)
Dept: MAMMOGRAPHY | Facility: CLINIC | Age: 43
Discharge: HOME | End: 2023-04-28
Attending: NURSE PRACTITIONER | Admitting: NURSE PRACTITIONER
Payer: OTHER GOVERNMENT

## 2023-04-28 DIAGNOSIS — R92.8 ABNORMAL MAMMOGRAM: ICD-10-CM

## 2023-04-28 PROCEDURE — 77065 DX MAMMO INCL CAD UNI: CPT | Mod: RT

## 2023-06-01 ENCOUNTER — TRANSFERRED RECORDS (OUTPATIENT)
Dept: MULTI SPECIALTY CLINIC | Facility: CLINIC | Age: 43
End: 2023-06-01

## 2023-06-01 LAB — RETINOPATHY: NORMAL

## 2023-09-22 ENCOUNTER — IMMUNIZATION (OUTPATIENT)
Dept: FAMILY MEDICINE | Facility: CLINIC | Age: 43
End: 2023-09-22
Payer: OTHER GOVERNMENT

## 2023-09-22 DIAGNOSIS — Z23 NEED FOR PROPHYLACTIC VACCINATION AND INOCULATION AGAINST INFLUENZA: Primary | ICD-10-CM

## 2023-09-22 PROCEDURE — 99207 PR NO CHARGE NURSE ONLY: CPT

## 2023-09-22 PROCEDURE — 90686 IIV4 VACC NO PRSV 0.5 ML IM: CPT

## 2023-09-22 PROCEDURE — 90471 IMMUNIZATION ADMIN: CPT

## 2023-09-30 ENCOUNTER — HEALTH MAINTENANCE LETTER (OUTPATIENT)
Age: 43
End: 2023-09-30

## 2023-12-26 ENCOUNTER — TRANSFERRED RECORDS (OUTPATIENT)
Dept: HEALTH INFORMATION MANAGEMENT | Facility: CLINIC | Age: 43
End: 2023-12-26
Payer: OTHER GOVERNMENT

## 2024-01-02 ENCOUNTER — TRANSFERRED RECORDS (OUTPATIENT)
Dept: HEALTH INFORMATION MANAGEMENT | Facility: CLINIC | Age: 44
End: 2024-01-02
Payer: OTHER GOVERNMENT

## 2024-04-23 ENCOUNTER — MYC MEDICAL ADVICE (OUTPATIENT)
Dept: FAMILY MEDICINE | Facility: CLINIC | Age: 44
End: 2024-04-23
Payer: OTHER GOVERNMENT

## 2024-04-23 NOTE — TELEPHONE ENCOUNTER
Called the pt.      She has breast tenderness that has not been there.  No lumps.  No discharge.  It is in both breasts.      Appt scheduled.      Appointments in Next Year      Apr 24, 2024 11:30 AM  (Arrive by 11:10 AM)  Provider Visit with FINN Knowles CNP  [facility] ([facility] ) [phone]

## 2024-04-24 ENCOUNTER — OFFICE VISIT (OUTPATIENT)
Dept: FAMILY MEDICINE | Facility: CLINIC | Age: 44
End: 2024-04-24
Payer: OTHER GOVERNMENT

## 2024-04-24 VITALS
DIASTOLIC BLOOD PRESSURE: 67 MMHG | HEIGHT: 66 IN | BODY MASS INDEX: 23.38 KG/M2 | RESPIRATION RATE: 16 BRPM | TEMPERATURE: 98 F | HEART RATE: 66 BPM | WEIGHT: 145.5 LBS | SYSTOLIC BLOOD PRESSURE: 102 MMHG | OXYGEN SATURATION: 100 %

## 2024-04-24 DIAGNOSIS — Z12.31 ENCOUNTER FOR SCREENING MAMMOGRAM FOR BREAST CANCER: Primary | ICD-10-CM

## 2024-04-24 PROBLEM — M26.69 TEMPOROMANDIBULAR JOINT CREPITUS: Status: ACTIVE | Noted: 2020-02-10

## 2024-04-24 PROBLEM — D23.9 BENIGN NEOPLASM OF SKIN: Status: ACTIVE | Noted: 2024-04-24

## 2024-04-24 PROCEDURE — 99213 OFFICE O/P EST LOW 20 MIN: CPT | Performed by: NURSE PRACTITIONER

## 2024-04-24 ASSESSMENT — PAIN SCALES - GENERAL: PAINLEVEL: NO PAIN (0)

## 2024-04-24 NOTE — PROGRESS NOTES
"Assessment & Plan     ICD-10-CM    1. Encounter for screening mammogram for breast cancer  Z12.31 MA Screening Digital Bilateral        I see no abnormal findings on exam today.  Ordered screening mammogram.  If further symptoms or increased tenderness can follow-up as indicated      FINN Knowles CNP  M [facility] [place]  ============================================  Subjective  Was trying to schedule mammogram but patient reported to schedulers that she was having breast pain.  Is here to follow-up on this.  Currently having mild breast pain without nipple discharge or redness or focal tenderness.  Does have this from time to time.  She has noted no masses on palpation at home.    History of Present Illness       Reason for visit:  New breast tenderness  Symptom onset:  More than a month    She eats 2-3 servings of fruits and vegetables daily.She consumes 1 sweetened beverage(s) daily.She exercises with enough effort to increase her heart rate 20 to 29 minutes per day.  She exercises with enough effort to increase her heart rate 4 days per week.   She is taking medications regularly.    Review of Systems   Constitutional: Negative.    Breasts:  Positive for tenderness. Negative for breast mass and discharge.     ============================================  Objective    /67 (BP Location: Right arm, Patient Position: Sitting, Cuff Size: Adult Large)   Pulse 66   Temp 98  F (36.7  C) (Oral)   Resp 16   Ht 1.676 m (5' 6\")   Wt 66 kg (145 lb 8 oz)   LMP 04/12/2024   SpO2 100%   BMI 23.48 kg/m    Physical Exam  Chest:      Comments: Breasts are symmetric.  No dominant, discrete, fixed  or suspicious masses are noted.  No skin or nipple changes or axillary/supraclavicular nodes.         ============================================  FINN Knowles CNP  Signed Electronically by: FINN Knowles CNP        "

## 2024-05-01 ENCOUNTER — HOSPITAL ENCOUNTER (OUTPATIENT)
Dept: MAMMOGRAPHY | Facility: CLINIC | Age: 44
Discharge: HOME | End: 2024-05-01
Attending: NURSE PRACTITIONER | Admitting: NURSE PRACTITIONER
Payer: OTHER GOVERNMENT

## 2024-05-01 DIAGNOSIS — Z12.31 VISIT FOR SCREENING MAMMOGRAM: ICD-10-CM

## 2024-05-01 PROCEDURE — 77063 BREAST TOMOSYNTHESIS BI: CPT

## 2024-05-04 ASSESSMENT — ENCOUNTER SYMPTOMS
CONSTITUTIONAL NEGATIVE: 1
BREAST MASS: 0

## 2024-08-08 SDOH — HEALTH STABILITY: PHYSICAL HEALTH: ON AVERAGE, HOW MANY MINUTES DO YOU ENGAGE IN EXERCISE AT THIS LEVEL?: 40 MIN

## 2024-08-08 SDOH — HEALTH STABILITY: PHYSICAL HEALTH: ON AVERAGE, HOW MANY DAYS PER WEEK DO YOU ENGAGE IN MODERATE TO STRENUOUS EXERCISE (LIKE A BRISK WALK)?: 4 DAYS

## 2024-08-08 ASSESSMENT — SOCIAL DETERMINANTS OF HEALTH (SDOH): HOW OFTEN DO YOU GET TOGETHER WITH FRIENDS OR RELATIVES?: TWICE A WEEK

## 2024-08-13 ENCOUNTER — OFFICE VISIT (OUTPATIENT)
Dept: FAMILY MEDICINE | Facility: CLINIC | Age: 44
End: 2024-08-13
Payer: OTHER GOVERNMENT

## 2024-08-13 VITALS
HEART RATE: 69 BPM | OXYGEN SATURATION: 100 % | BODY MASS INDEX: 22.45 KG/M2 | DIASTOLIC BLOOD PRESSURE: 70 MMHG | SYSTOLIC BLOOD PRESSURE: 110 MMHG | HEIGHT: 66 IN | WEIGHT: 139.7 LBS | TEMPERATURE: 98.4 F | RESPIRATION RATE: 12 BRPM

## 2024-08-13 DIAGNOSIS — Z00.00 ROUTINE GENERAL MEDICAL EXAMINATION AT A HEALTH CARE FACILITY: Primary | ICD-10-CM

## 2024-08-13 DIAGNOSIS — Z12.11 SPECIAL SCREENING FOR MALIGNANT NEOPLASMS, COLON: ICD-10-CM

## 2024-08-13 PROCEDURE — 99396 PREV VISIT EST AGE 40-64: CPT | Performed by: NURSE PRACTITIONER

## 2024-08-13 ASSESSMENT — PAIN SCALES - GENERAL: PAINLEVEL: NO PAIN (0)

## 2024-08-13 NOTE — PROGRESS NOTES
Preventive Care Visit  [facility] DOUGMOINESSA Edward, APRN CNP, Family Practice  Aug 13, 2024      Assessment & Plan     Routine general medical examination at a health care facility      Special screening for malignant neoplasms, colon  - Colonoscopy Screening  Referral; Future            Counseling  Appropriate preventive services were addressed with this patient via screening, questionnaire, or discussion as appropriate for fall prevention, nutrition, physical activity, Tobacco-use cessation, weight loss and cognition.  Checklist reviewing preventive services available has been given to the patient.  Reviewed patient's diet, addressing concerns and/or questions.   She is at risk for psychosocial distress and has been provided with information to reduce risk.           Subjective   Rafa is a 43 year old, presenting for the following:  Physical        8/13/2024     9:12 AM   Additional Questions   Roomed by MR   Accompanied by NA         8/13/2024     9:12 AM   Patient Reported Additional Medications   Patient reports taking the following new medications NA        Health Care Directive  Patient does not have a Health Care Directive or Living Will: Discussed advance care planning with patient; however, patient declined at this time.    HPI      -PT IS NOT FASTING    -Pt has not gotten an eye exam in the last year. Plans on scheduling soon, within the next month.     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 Will check records and notify next time      Doing well.  No concerns.          8/8/2024   General Health   How would you rate your overall physical health? Good   Feel stress (tense, anxious, or unable to sleep) Only a little      (!) STRESS CONCERN      8/8/2024   Nutrition   Three or more servings of calcium each day? Yes   Diet: Regular (no restrictions)   How many servings of fruit and vegetables per day? (!) 2-3   How many sweetened  beverages each day? 0-1            8/8/2024   Exercise   Days per week of moderate/strenous exercise 4 days   Average minutes spent exercising at this level 40 min            8/8/2024   Social Factors   Frequency of gathering with friends or relatives Twice a week   Worry food won't last until get money to buy more No   Food not last or not have enough money for food? No   Do you have housing? (Housing is defined as stable permanent housing and does not include staying ouside in a car, in a tent, in an abandoned building, in an overnight shelter, or couch-surfing.) Yes   Are you worried about losing your housing? No   Lack of transportation? No   Unable to get utilities (heat,electricity)? No            8/8/2024   Dental   Dentist two times every year? Yes            8/8/2024   TB Screening   Were you born outside of the [place]? No              Today's PHQ-2 Score:       4/24/2024    11:02 AM   PHQ-2 ( 1999 [employer])   Q1: Little interest or pleasure in doing things 0   Q2: Feeling down, depressed or hopeless 0   PHQ-2 Score 0   Q1: Little interest or pleasure in doing things Not at all   Q2: Feeling down, depressed or hopeless Not at all   PHQ-2 Score 0         8/8/2024   Substance Use   Alcohol more than 3/day or more than 7/wk No   Do you use any other substances recreationally? No        Social History     Tobacco Use    Smoking status: Never    Smokeless tobacco: Never   Vaping Use    Vaping status: Never Used   Substance Use Topics    Alcohol use: Yes     Alcohol/week: 1.0 standard drink of alcohol     Types: 1 Glasses of wine per week    Drug use: Never           5/1/2024   LAST FHS-7 RESULTS   1st degree relative breast or ovarian cancer No   Any relative bilateral breast cancer No   Any male have breast cancer No   Any ONE woman have BOTH breast AND ovarian cancer No   Any woman with breast cancer before 50yrs No   2 or more relatives with breast AND/OR ovarian cancer No   2 or more relatives with breast AND/OR  bowel cancer No                     8/8/2024   One time HIV Screening   Previous HIV test? Yes          8/8/2024   STI Screening   New sexual partner(s) since last STI/HIV test? No        History of abnormal Pap smear: No - age 30- 64 PAP with HPV every 5 years recommended        Latest Ref Rng & Units 7/28/2022    10:46 AM 7/19/2017    10:30 AM 7/19/2017    10:00 AM   PAP / HPV   PAP  Negative for Intraepithelial Lesion or Malignancy (NILM)      PAP (Historical)   NIL     HPV 16 DNA Negative Negative   Negative    HPV 18 DNA Negative Negative   Negative    Other HR HPV Negative Negative   Negative      ASCVD Risk   The 10-year ASCVD risk score (Soniya ROBERTO, et al., 2019) is: 0.2%    Values used to calculate the score:      Age: 43 years      Sex: Female      Is Non- : No      Diabetic: No      Tobacco smoker: No      Systolic Blood Pressure: 110 mmHg      Is BP treated: No      HDL Cholesterol: 69 mg/dL      Total Cholesterol: 140 mg/dL        8/8/2024   Contraception/Family Planning   Questions about contraception or family planning No           Reviewed and updated as needed this visit by Provider                    Patient Active Problem List   Diagnosis    Migraine    Chronic pain of right knee    Benign neoplasm of skin    Temporomandibular joint crepitus     Past Surgical History:   Procedure Laterality Date    BIOPSY      skin mole removal    PHOTOREFRACTIVE KERATECTOMY  01/01/2004       Social History     Tobacco Use    Smoking status: Never    Smokeless tobacco: Never   Substance Use Topics    Alcohol use: Yes     Alcohol/week: 1.0 standard drink of alcohol     Types: 1 Glasses of wine per week     Family History   Problem Relation Age of Onset    Glaucoma Maternal Grandmother     Family History Negative Mother     Cerebrovascular Disease Father     Clotting Disorder Father         Prothrombing Factor 2 Mutation.  found after stroke, did not recommend family members be  "tested.    Other Cancer Father         Basal Cell Skin Cancer    Diabetes Maternal Grandfather     Diabetes Paternal Grandfather     Macular Degeneration No family hx of              Review of Systems  Constitutional, HEENT, cardiovascular, pulmonary, gi and gu systems are negative, except as otherwise noted.     Objective    Exam  /70 (BP Location: Right arm, Patient Position: Sitting, Cuff Size: Adult Regular)   Pulse 69   Temp 98.4  F (36.9  C) (Tympanic)   Resp 12   Ht 1.676 m (5' 6\")   Wt 63.4 kg (139 lb 11.2 oz)   LMP 07/21/2024 (Approximate)   SpO2 100%   BMI 22.55 kg/m     Estimated body mass index is 22.55 kg/m  as calculated from the following:    Height as of this encounter: 1.676 m (5' 6\").    Weight as of this encounter: 63.4 kg (139 lb 11.2 oz).    Physical Exam  GENERAL: alert and no distress  EYES: Eyes grossly normal to inspection  HENT: ear canals and TM's normal, nose and mouth without ulcers or lesions  NECK: no adenopathy, no asymmetry, masses, or scars  RESP: lungs clear to auscultation - no rales, rhonchi or wheezes  CV: regular rate and rhythm, normal S1 S2, no S3 or S4, no murmur, click or rub, no peripheral edema  ABDOMEN: soft, nontender, no hepatosplenomegaly, no masses and bowel sounds normal  NEURO: Normal strength and tone, mentation intact and speech normal  PSYCH: mentation appears normal, affect normal/bright        Signed Electronically by: FINN Campos Ra CNP    "

## 2024-08-13 NOTE — PATIENT INSTRUCTIONS
Patient Education   Preventive Care Advice   This is general advice given by our system to help you stay healthy. However, your care team may have specific advice just for you. Please talk to your care team about your preventive care needs.  Nutrition  Eat 5 or more servings of fruits and vegetables each day.  Try wheat bread, brown rice and whole grain pasta (instead of white bread, rice, and pasta).  Get enough calcium and vitamin D. Check the label on foods and aim for 100% of the RDA (recommended daily allowance).  Lifestyle  Exercise at least 150 minutes each week  (30 minutes a day, 5 days a week).  Do muscle strengthening activities 2 days a week. These help control your weight and prevent disease.  No smoking.  Wear sunscreen to prevent skin cancer.  Have a dental exam and cleaning every 6 months.  Yearly exams  See your health care team every year to talk about:  Any changes in your health.  Any medicines your care team has prescribed.  Preventive care, family planning, and ways to prevent chronic diseases.  Shots (vaccines)   HPV shots (up to age 26), if you've never had them before.  Hepatitis B shots (up to age 59), if you've never had them before.  COVID-19 shot: Get this shot when it's due.  Flu shot: Get a flu shot every year.  Tetanus shot: Get a tetanus shot every 10 years.  Pneumococcal, hepatitis A, and RSV shots: Ask your care team if you need these based on your risk.  Shingles shot (for age 50 and up)  General health tests  Diabetes screening:  Starting at age 35, Get screened for diabetes at least every 3 years.  If you are younger than age 35, ask your care team if you should be screened for diabetes.  Cholesterol test: At age 39, start having a cholesterol test every 5 years, or more often if advised.  Bone density scan (DEXA): At age 50, ask your care team if you should have this scan for osteoporosis (brittle bones).  Hepatitis C: Get tested at least once in your life.  STIs (sexually  transmitted infections)  Before age 24: Ask your care team if you should be screened for STIs.  After age 24: Get screened for STIs if you're at risk. You are at risk for STIs (including HIV) if:  You are sexually active with more than one person.  You don't use condoms every time.  You or a partner was diagnosed with a sexually transmitted infection.  If you are at risk for HIV, ask about PrEP medicine to prevent HIV.  Get tested for HIV at least once in your life, whether you are at risk for HIV or not.  Cancer screening tests  Cervical cancer screening: If you have a cervix, begin getting regular cervical cancer screening tests starting at age 21.  Breast cancer scan (mammogram): If you've ever had breasts, begin having regular mammograms starting at age 40. This is a scan to check for breast cancer.  Colon cancer screening: It is important to start screening for colon cancer at age 45.  Have a colonoscopy test every 10 years (or more often if you're at risk) Or, ask your provider about stool tests like a FIT test every year or Cologuard test every 3 years.  To learn more about your testing options, visit:   .  For help making a decision, visit:   https://bit.ly/em28371.  Prostate cancer screening test: If you have a prostate, ask your care team if a prostate cancer screening test (PSA) at age 55 is right for you.  Lung cancer screening: If you are a current or former smoker ages 50 to 80, ask your care team if ongoing lung cancer screenings are right for you.  For informational purposes only. Not to replace the advice of your health care provider. Copyright   2023 [employer]. All rights reserved. Clinically reviewed by the [facility] Transitions Program. Cardio control 552118 - REV 01/24.

## 2024-08-29 ENCOUNTER — TELEPHONE (OUTPATIENT)
Dept: GASTROENTEROLOGY | Facility: CLINIC | Age: 44
End: 2024-08-29
Payer: OTHER GOVERNMENT

## 2024-08-29 NOTE — TELEPHONE ENCOUNTER
"Endoscopy Scheduling Screen    Have you had a positive Covid test in the last 14 days?  No    What is your communication preference for Instructions and/or Bowel Prep?   MyChart    What insurance is in the chart?  Other:  [employer]    Ordering/Referring Provider:   ANN HERNANDEZ RA        (If ordering provider performs procedure, schedule with ordering provider unless otherwise instructed. )    BMI: Estimated body mass index is 22.55 kg/m  as calculated from the following:    Height as of 8/13/24: 1.676 m (5' 6\").    Weight as of 8/13/24: 63.4 kg (139 lb 11.2 oz).     Sedation Ordered  moderate sedation.   If patient BMI > 50 do not schedule in [facility].    If patient BMI > 45 do not schedule at [facility].    Are you taking methadone or Suboxone?  No    Have you had difficulties, pain, or discomfort during past endoscopy procedures?  No    Are you taking any prescription medications for pain 3 or more times per week?   NO, No RN review required.    Do you have a history of malignant hyperthermia?  No    (Females) Are you currently pregnant?   No     Have you been diagnosed or told you have pulmonary hypertension?   No    Do you have an LVAD?  No    Have you been told you have moderate to severe sleep apnea?  No    Have you been told you have COPD, asthma, or any other lung disease?  No    Do you have any heart conditions?  No     Have you ever had or are you waiting for an organ transplant?  No. Continue scheduling, no site restrictions.    Have you had a stroke or transient ischemic attack (TIA aka \"mini stroke\" in the last 6 months?   No    Have you been diagnosed with or been told you have cirrhosis of the liver?   No    Are you currently on dialysis?   No    Do you need assistance transferring?   No    BMI: Estimated body mass index is 22.55 kg/m  as calculated from the following:    Height as of 8/13/24: 1.676 m (5' 6\").    Weight as of 8/13/24: 63.4 kg (139 lb 11.2 oz).     Is patients BMI > 40 and scheduling location " [facility]?  No    Do you take an injectable medication for weight loss or diabetes (excluding insulin)?  No    Do you take the medication Naltrexone?  No    Do you take blood thinners?  No       Prep   Are you currently on dialysis or do you have chronic kidney disease?  No    Do you have a diagnosis of diabetes?  No    Do you have a diagnosis of cystic fibrosis (CF)?  No    On a regular basis do you go 3 -5 days between bowel movements?  No    BMI > 40?  No    Preferred Pharmacy:        [facility] [address] [facility]  [address] [facility] [address]  Phone: [phone] Fax: [phone]          Final Scheduling Details     Procedure scheduled  Colonoscopy    Surgeon:  Fortunato     Date of procedure:  1/7/25     Pre-OP / PAC:   No - Not required for this site.    Location  [facility] - Patient preference.    Sedation   Moderate Sedation - Per order.      Patient Reminders:   You will receive a call from a Nurse to review instructions and health history.  This assessment must be completed prior to your procedure.  Failure to complete the Nurse assessment may result in the procedure being cancelled.      On the day of your procedure, please designate an adult(s) who can drive you home stay with you for the next 24 hours. The medicines used in the exam will make you sleepy. You will not be able to drive.      You cannot take public transportation, ride share services, or non-medical taxi service without a responsible caregiver.  Medical transport services are allowed with the requirement that a responsible caregiver will receive you at your destination.  We require that drivers and caregivers are confirmed prior to your procedure.

## 2025-01-21 ENCOUNTER — HOSPITAL ENCOUNTER (OUTPATIENT)
Facility: CLINIC | Age: 45
Discharge: HOME | End: 2025-01-21
Attending: INTERNAL MEDICINE | Admitting: INTERNAL MEDICINE
Payer: COMMERCIAL

## 2025-01-21 VITALS
WEIGHT: 140 LBS | RESPIRATION RATE: 16 BRPM | HEART RATE: 67 BPM | DIASTOLIC BLOOD PRESSURE: 66 MMHG | HEIGHT: 66 IN | OXYGEN SATURATION: 100 % | BODY MASS INDEX: 22.5 KG/M2 | SYSTOLIC BLOOD PRESSURE: 109 MMHG

## 2025-01-21 LAB — COLONOSCOPY: NORMAL

## 2025-01-21 PROCEDURE — 45378 DIAGNOSTIC COLONOSCOPY: CPT | Performed by: INTERNAL MEDICINE

## 2025-01-21 PROCEDURE — G0500 MOD SEDAT ENDO SERVICE >5YRS: HCPCS | Performed by: INTERNAL MEDICINE

## 2025-01-21 PROCEDURE — G0121 COLON CA SCRN NOT HI RSK IND: HCPCS | Performed by: INTERNAL MEDICINE

## 2025-01-21 PROCEDURE — 250N000011 HC RX IP 250 OP 636: Performed by: INTERNAL MEDICINE

## 2025-01-21 PROCEDURE — 99153 MOD SED SAME PHYS/QHP EA: CPT | Performed by: INTERNAL MEDICINE

## 2025-01-21 PROCEDURE — 258N000003 HC RX IP 258 OP 636: Performed by: INTERNAL MEDICINE

## 2025-01-21 RX ORDER — FLUMAZENIL 0.1 MG/ML
0.2 INJECTION, SOLUTION INTRAVENOUS
Status: DISCONTINUED | OUTPATIENT
Start: 2025-01-21 | End: 2025-01-21 | Stop reason: HOSPADM

## 2025-01-21 RX ORDER — NALOXONE HYDROCHLORIDE 0.4 MG/ML
0.2 INJECTION, SOLUTION INTRAMUSCULAR; INTRAVENOUS; SUBCUTANEOUS
Status: DISCONTINUED | OUTPATIENT
Start: 2025-01-21 | End: 2025-01-21 | Stop reason: HOSPADM

## 2025-01-21 RX ORDER — PROCHLORPERAZINE MALEATE 10 MG
10 TABLET ORAL EVERY 6 HOURS PRN
Status: DISCONTINUED | OUTPATIENT
Start: 2025-01-21 | End: 2025-01-21 | Stop reason: HOSPADM

## 2025-01-21 RX ORDER — NALOXONE HYDROCHLORIDE 0.4 MG/ML
0.4 INJECTION, SOLUTION INTRAMUSCULAR; INTRAVENOUS; SUBCUTANEOUS
Status: DISCONTINUED | OUTPATIENT
Start: 2025-01-21 | End: 2025-01-21 | Stop reason: HOSPADM

## 2025-01-21 RX ORDER — ONDANSETRON 2 MG/ML
4 INJECTION INTRAMUSCULAR; INTRAVENOUS EVERY 6 HOURS PRN
Status: DISCONTINUED | OUTPATIENT
Start: 2025-01-21 | End: 2025-01-21 | Stop reason: HOSPADM

## 2025-01-21 RX ORDER — DIPHENHYDRAMINE HYDROCHLORIDE 50 MG/ML
25-50 INJECTION INTRAMUSCULAR; INTRAVENOUS
Status: DISCONTINUED | OUTPATIENT
Start: 2025-01-21 | End: 2025-01-21 | Stop reason: HOSPADM

## 2025-01-21 RX ORDER — ATROPINE SULFATE 0.1 MG/ML
1 INJECTION INTRAVENOUS
Status: DISCONTINUED | OUTPATIENT
Start: 2025-01-21 | End: 2025-01-21 | Stop reason: HOSPADM

## 2025-01-21 RX ORDER — FENTANYL CITRATE 50 UG/ML
50-100 INJECTION, SOLUTION INTRAMUSCULAR; INTRAVENOUS EVERY 5 MIN PRN
Status: DISCONTINUED | OUTPATIENT
Start: 2025-01-21 | End: 2025-01-21 | Stop reason: HOSPADM

## 2025-01-21 RX ORDER — ONDANSETRON 4 MG/1
4 TABLET, ORALLY DISINTEGRATING ORAL EVERY 6 HOURS PRN
Status: DISCONTINUED | OUTPATIENT
Start: 2025-01-21 | End: 2025-01-21 | Stop reason: HOSPADM

## 2025-01-21 RX ORDER — LIDOCAINE 40 MG/G
CREAM TOPICAL
Status: DISCONTINUED | OUTPATIENT
Start: 2025-01-21 | End: 2025-01-21 | Stop reason: HOSPADM

## 2025-01-21 RX ORDER — SIMETHICONE 40MG/0.6ML
133 SUSPENSION, DROPS(FINAL DOSAGE FORM)(ML) ORAL
Status: DISCONTINUED | OUTPATIENT
Start: 2025-01-21 | End: 2025-01-21 | Stop reason: HOSPADM

## 2025-01-21 RX ORDER — EPINEPHRINE 1 MG/ML
0.1 INJECTION, SOLUTION, CONCENTRATE INTRAVENOUS
Status: DISCONTINUED | OUTPATIENT
Start: 2025-01-21 | End: 2025-01-21 | Stop reason: HOSPADM

## 2025-01-21 RX ORDER — ONDANSETRON 2 MG/ML
4 INJECTION INTRAMUSCULAR; INTRAVENOUS
Status: COMPLETED | OUTPATIENT
Start: 2025-01-21 | End: 2025-01-21

## 2025-01-21 RX ADMIN — FENTANYL CITRATE 100 MCG: 50 INJECTION, SOLUTION INTRAMUSCULAR; INTRAVENOUS at 09:17

## 2025-01-21 RX ADMIN — MIDAZOLAM 2 MG: 1 INJECTION INTRAMUSCULAR; INTRAVENOUS at 09:17

## 2025-01-21 RX ADMIN — MIDAZOLAM 1 MG: 1 INJECTION INTRAMUSCULAR; INTRAVENOUS at 09:38

## 2025-01-21 RX ADMIN — FENTANYL CITRATE 50 MCG: 50 INJECTION, SOLUTION INTRAMUSCULAR; INTRAVENOUS at 09:24

## 2025-01-21 RX ADMIN — MIDAZOLAM 1 MG: 1 INJECTION INTRAMUSCULAR; INTRAVENOUS at 09:24

## 2025-01-21 RX ADMIN — FENTANYL CITRATE 50 MCG: 50 INJECTION, SOLUTION INTRAMUSCULAR; INTRAVENOUS at 09:38

## 2025-01-21 RX ADMIN — SODIUM CHLORIDE 500 ML: 9 INJECTION, SOLUTION INTRAVENOUS at 10:16

## 2025-01-21 RX ADMIN — ONDANSETRON 4 MG: 2 INJECTION INTRAMUSCULAR; INTRAVENOUS at 10:09

## 2025-01-21 RX ADMIN — PROCHLORPERAZINE EDISYLATE 10 MG: 5 INJECTION INTRAMUSCULAR; INTRAVENOUS at 11:09

## 2025-01-21 ASSESSMENT — ACTIVITIES OF DAILY LIVING (ADL)
ADLS_ACUITY_SCORE: 41

## 2025-01-21 NOTE — H&P
Pre-Endoscopy History and Physical     Rafa Herring MRN# [id number]   YOB: 1980 Age: 44 year old     Date of Procedure: 1/21/2025  Primary care provider: Alethea Edward  Type of Endoscopy: Colonoscopy with possible biopsy, possible polypectomy  Reason for Procedure: screen  Type of Anesthesia Anticipated: Conscious Sedation    HPI:    Rafa is a 44 year old female who will be undergoing the above procedure.      A history and physical has been performed. The patient's medications and allergies have been reviewed. The risks and benefits of the procedure and the sedation options and risks were discussed with the patient.  All questions were answered and informed consent was obtained.      She denies a personal or family history of anesthesia complications or bleeding disorders.     Patient Active Problem List   Diagnosis    Migraine    Chronic pain of right knee    Benign neoplasm of skin    Temporomandibular joint crepitus        History reviewed. No pertinent past medical history.     Past Surgical History:   Procedure Laterality Date    BIOPSY      skin mole removal    PHOTOREFRACTIVE KERATECTOMY  01/01/2004       Social History     Tobacco Use    Smoking status: Never    Smokeless tobacco: Never   Substance Use Topics    Alcohol use: Yes     Alcohol/week: 1.0 standard drink of alcohol     Types: 1 Glasses of wine per week       Family History   Problem Relation Age of Onset    Family History Negative Mother     Cerebrovascular Disease Father     Clotting Disorder Father         Prothrombing Factor 2 Mutation.  found after stroke, did not recommend family members be tested.    Other Cancer Father         Basal Cell Skin Cancer    Glaucoma Maternal Grandmother     Diabetes Maternal Grandfather     Diabetes Paternal Grandfather     Macular Degeneration No family hx of     Colon Cancer No family hx of        Prior to Admission medications    Medication Sig Start Date End Date Taking? Authorizing  "Provider   magnesium 250 MG tablet Take 1 tablet by mouth daily 7/19/17  Yes Alethea Edward, FINN CNP       No Known Allergies     REVIEW OF SYSTEMS:   5 point ROS negative except as noted above in HPI, including Gen., Resp., CV, GI &  system review.    PHYSICAL EXAM:   Ht 1.676 m (5' 6\")   Wt 63.5 kg (140 lb)   BMI 22.60 kg/m   Estimated body mass index is 22.6 kg/m  as calculated from the following:    Height as of this encounter: 1.676 m (5' 6\").    Weight as of this encounter: 63.5 kg (140 lb).   GENERAL APPEARANCE: alert, and oriented  MENTAL STATUS: alert  AIRWAY EXAM: Mallampatti Class I (visualization of the soft palate, fauces, uvula, anterior and posterior pillars)  RESP: lungs clear to auscultation - no rales, rhonchi or wheezes  CV: regular rates and rhythm  DIAGNOSTICS:    Not indicated    IMPRESSION   ASA Class 2 - Mild systemic disease    PLAN:   Plan for Colonoscopy with possible biopsy, possible polypectomy. We discussed the risks, benefits and alternatives and the patient wished to proceed.    The above has been forwarded to the consulting provider.      Signed Electronically by: Zion Good MD  January 21, 2025          "

## 2025-05-05 ENCOUNTER — HOSPITAL ENCOUNTER (OUTPATIENT)
Dept: MAMMOGRAPHY | Facility: CLINIC | Age: 45
Discharge: HOME | End: 2025-05-05
Attending: NURSE PRACTITIONER | Admitting: NURSE PRACTITIONER
Payer: COMMERCIAL

## 2025-05-05 DIAGNOSIS — Z12.31 VISIT FOR SCREENING MAMMOGRAM: ICD-10-CM

## 2025-05-05 PROCEDURE — 77063 BREAST TOMOSYNTHESIS BI: CPT

## 2025-07-14 ENCOUNTER — PATIENT OUTREACH (OUTPATIENT)
Dept: CARE COORDINATION | Facility: CLINIC | Age: 45
End: 2025-07-14
Payer: COMMERCIAL

## (undated) DEVICE — KIT ENDO TURNOVER/PROCEDURE W/CLEAN A SCOPE LINERS 103888

## (undated) RX ORDER — FENTANYL CITRATE 50 UG/ML
INJECTION, SOLUTION INTRAMUSCULAR; INTRAVENOUS
Status: DISPENSED
Start: 2025-01-21